# Patient Record
Sex: FEMALE | Race: WHITE | NOT HISPANIC OR LATINO | URBAN - METROPOLITAN AREA
[De-identification: names, ages, dates, MRNs, and addresses within clinical notes are randomized per-mention and may not be internally consistent; named-entity substitution may affect disease eponyms.]

---

## 2021-09-16 ENCOUNTER — OFFICE (OUTPATIENT)
Dept: URBAN - METROPOLITAN AREA CLINIC 64 | Facility: CLINIC | Age: 26
End: 2021-09-16

## 2021-09-16 VITALS
SYSTOLIC BLOOD PRESSURE: 135 MMHG | HEIGHT: 66 IN | DIASTOLIC BLOOD PRESSURE: 91 MMHG | WEIGHT: 240 LBS | HEART RATE: 106 BPM

## 2021-09-16 DIAGNOSIS — R94.5 ABNORMAL RESULTS OF LIVER FUNCTION STUDIES: ICD-10-CM

## 2021-09-16 DIAGNOSIS — B18.2 CHRONIC VIRAL HEPATITIS C: ICD-10-CM

## 2021-09-16 PROCEDURE — 99203 OFFICE O/P NEW LOW 30 MIN: CPT | Performed by: NURSE PRACTITIONER

## 2021-09-16 RX ORDER — FAMOTIDINE 40 MG/1
TABLET, FILM COATED ORAL
Qty: 0 | Refills: 0 | Status: ACTIVE

## 2021-10-28 ENCOUNTER — TELEMEDICINE (OUTPATIENT)
Dept: FAMILY MEDICINE CLINIC | Facility: CLINIC | Age: 26
End: 2021-10-28

## 2021-10-28 DIAGNOSIS — I10 PRIMARY HYPERTENSION: Primary | ICD-10-CM

## 2021-10-28 DIAGNOSIS — F41.9 ANXIETY: ICD-10-CM

## 2021-10-28 DIAGNOSIS — L02.91 CUTANEOUS ABSCESS, UNSPECIFIED SITE: ICD-10-CM

## 2021-10-28 PROCEDURE — 99203 OFFICE O/P NEW LOW 30 MIN: CPT | Performed by: NURSE PRACTITIONER

## 2021-10-28 RX ORDER — SULFAMETHOXAZOLE AND TRIMETHOPRIM 800; 160 MG/1; MG/1
1 TABLET ORAL 2 TIMES DAILY
Qty: 14 TABLET | Refills: 0 | Status: SHIPPED | OUTPATIENT
Start: 2021-10-28 | End: 2021-12-02

## 2021-10-28 RX ORDER — VELPATASVIR AND SOFOSBUVIR 100; 400 MG/1; MG/1
TABLET, FILM COATED ORAL
COMMUNITY
Start: 2021-10-22 | End: 2022-06-23

## 2021-10-28 RX ORDER — LISINOPRIL 5 MG/1
5 TABLET ORAL DAILY
Qty: 30 TABLET | Refills: 1 | Status: SHIPPED | OUTPATIENT
Start: 2021-10-28 | End: 2021-11-22

## 2021-10-28 RX ORDER — FLUOXETINE 10 MG/1
10 CAPSULE ORAL DAILY
Qty: 30 CAPSULE | Refills: 1 | Status: SHIPPED | OUTPATIENT
Start: 2021-10-28 | End: 2021-11-22

## 2021-11-01 PROBLEM — F41.9 ANXIETY: Status: ACTIVE | Noted: 2021-11-01

## 2021-11-01 PROBLEM — I10 PRIMARY HYPERTENSION: Status: ACTIVE | Noted: 2021-11-01

## 2021-11-01 PROBLEM — L02.91 ABSCESS OF SKIN: Status: ACTIVE | Noted: 2021-11-01

## 2021-11-01 NOTE — ASSESSMENT & PLAN NOTE
1. Bactrim DS x 7 days  2. Mupirocin ointment to bilateral nares daily x 7 days  3. Warm compresses to new abscesses, avoiding picking or lancing  4. Consider Derm vs ID referral if persists

## 2021-11-11 ENCOUNTER — TELEPHONE (OUTPATIENT)
Dept: FAMILY MEDICINE CLINIC | Facility: CLINIC | Age: 26
End: 2021-11-11

## 2021-11-11 NOTE — TELEPHONE ENCOUNTER
Caller: Ning Gomez    Relationship: Self    Best call back number:[phone]     What medications are you currently taking:   Current Outpatient Medications on File Prior to Visit   Medication Sig Dispense Refill   • Epclusa 400-100 MG tablet Starting on Monday 11/1/21     • FLUoxetine (PROzac) 10 MG capsule Take 1 capsule by mouth Daily. 30 capsule 1   • lisinopril (PRINIVIL,ZESTRIL) 5 MG tablet Take 1 tablet by mouth Daily. 30 tablet 1   • mupirocin (BACTROBAN) 2 % ointment Apply 1 application topically to the appropriate area as directed Daily. 30 g 0   • sulfamethoxazole-trimethoprim (Bactrim DS) 800-160 MG per tablet Take 1 tablet by mouth 2 (Two) Times a Day. 14 tablet 0   • [DISCONTINUED] traZODone (DESYREL) 100 MG tablet        No current facility-administered medications on file prior to visit.          When did you start taking these medications: 2 WEEKS AGO    Which medication are you concerned about:   • FLUoxetine (PROzac) 10 MG capsule       Who prescribed you this medication: REUBEN GORDON    What are your concerns: PATIENT STATES SHE IS GETTING HER EYEBROWS MICROBLADED AND THEY ARE ASKING THAT SHE STOP TAKING THIS MEDICATION FOR 24 HOURS PRIOR TO SECOND APPOINTMENT. THE MEDICATION IS CAUSING A REACTION TO THE MICROBLADING CHEMICALS.  PATIENT STATES SHE WILL MAKE THE APPOINTMENT FOR HER EYEBROWS ONCE SHE HEARS FROM THE DOCTOR. IT JUST NEEDS TO BE FOR 24 HOURS AND CAN TAKE IT AS SOON AS APPOINTMENT IS OVER.     How long have you had these concerns: 11-11-21

## 2021-11-11 NOTE — TELEPHONE ENCOUNTER
Holding for 24 hours should not cause any problems.  Resume medication regularly once procedure is complete

## 2021-11-22 RX ORDER — FLUOXETINE 10 MG/1
CAPSULE ORAL
Qty: 30 CAPSULE | Refills: 1 | Status: SHIPPED | OUTPATIENT
Start: 2021-11-22 | End: 2021-12-13

## 2021-11-22 RX ORDER — LISINOPRIL 5 MG/1
TABLET ORAL
Qty: 30 TABLET | Refills: 1 | Status: SHIPPED | OUTPATIENT
Start: 2021-11-22 | End: 2021-12-16

## 2021-11-22 NOTE — TELEPHONE ENCOUNTER
Rx Refill Note  Requested Prescriptions     Pending Prescriptions Disp Refills   • FLUoxetine (PROzac) 10 MG capsule [Pharmacy Med Name: FLUOXETINE HCL 10 MG CAPSULE] 30 capsule 1     Sig: TAKE 1 CAPSULE BY MOUTH EVERY DAY   • lisinopril (PRINIVIL,ZESTRIL) 5 MG tablet [Pharmacy Med Name: LISINOPRIL 5 MG TABLET] 30 tablet 1     Sig: TAKE 1 TABLET BY MOUTH EVERY DAY      Last office visit with prescribing clinician: Visit date not found      Next office visit with prescribing clinician: 12/2/2021            Gisele Max MA  11/22/21, 09:05 EST

## 2021-12-02 ENCOUNTER — OFFICE VISIT (OUTPATIENT)
Dept: FAMILY MEDICINE CLINIC | Facility: CLINIC | Age: 26
End: 2021-12-02

## 2021-12-02 VITALS
DIASTOLIC BLOOD PRESSURE: 80 MMHG | HEART RATE: 103 BPM | BODY MASS INDEX: 38.41 KG/M2 | WEIGHT: 239 LBS | SYSTOLIC BLOOD PRESSURE: 122 MMHG | OXYGEN SATURATION: 97 % | HEIGHT: 66 IN

## 2021-12-02 DIAGNOSIS — I10 PRIMARY HYPERTENSION: ICD-10-CM

## 2021-12-02 DIAGNOSIS — Z00.00 PREVENTATIVE HEALTH CARE: ICD-10-CM

## 2021-12-02 DIAGNOSIS — F41.9 ANXIETY: ICD-10-CM

## 2021-12-02 DIAGNOSIS — R19.7 DIARRHEA, UNSPECIFIED TYPE: Primary | ICD-10-CM

## 2021-12-02 PROCEDURE — 85025 COMPLETE CBC W/AUTO DIFF WBC: CPT | Performed by: NURSE PRACTITIONER

## 2021-12-02 PROCEDURE — 80061 LIPID PANEL: CPT | Performed by: NURSE PRACTITIONER

## 2021-12-02 PROCEDURE — 86803 HEPATITIS C AB TEST: CPT | Performed by: NURSE PRACTITIONER

## 2021-12-02 PROCEDURE — 84443 ASSAY THYROID STIM HORMONE: CPT | Performed by: NURSE PRACTITIONER

## 2021-12-02 PROCEDURE — 80053 COMPREHEN METABOLIC PANEL: CPT | Performed by: NURSE PRACTITIONER

## 2021-12-02 PROCEDURE — 99214 OFFICE O/P EST MOD 30 MIN: CPT | Performed by: NURSE PRACTITIONER

## 2021-12-02 RX ORDER — DIPHENOXYLATE HYDROCHLORIDE AND ATROPINE SULFATE 2.5; .025 MG/1; MG/1
1 TABLET ORAL 4 TIMES DAILY PRN
Qty: 30 TABLET | Refills: 0 | Status: SHIPPED | OUTPATIENT
Start: 2021-12-02 | End: 2022-06-23

## 2021-12-02 NOTE — PROGRESS NOTES
"Chief Complaint  Follow-up (4 week follow up depression)    Subjective          Ning Gomez presents to [facility]  History of Present Illness    Patient here for f/u visit.     Patient has anxiety, previously started on Prozac 10 mg daily.  Patient is taking medication as prescribed with no complications.  She reports improvement in feeling down and a noticeable improvement in anxiety, though anxiety does persist.  Patient denies any panic attacks since starting Prozac.    Patient was started on lisinopril 5 mg daily for hypertension, blood pressure is stable today.  Patient denies dizziness, headache or vision changes.  However, she does report that at the end of her workday she is noticing a mild headache.  Patient denies chest pain, cough or dyspnea.    Patient followed by [facility] for Hepatitis C, recently started on Epclusa.  Patient reports that since starting lisinopril, Prozac and Epclusa, she has had persistent watery diarrhea.  She denies abdominal pain, nausea, vomiting, fever, blood or mucus in stool.  No known aggravating or alleviating factors.  Patient has not taking anything OTC to treat.    Objective   Vital Signs:   /80 (BP Location: Left arm, Patient Position: Sitting, Cuff Size: Large Adult)   Pulse 103   Ht 167.6 cm (66\")   Wt 108 kg (239 lb)   SpO2 97%   BMI 38.58 kg/m²       Physical Exam  Constitutional:       Appearance: Normal appearance.   HENT:      Head: Normocephalic.   Cardiovascular:      Rate and Rhythm: Normal rate and regular rhythm.   Pulmonary:      Effort: Pulmonary effort is normal.      Breath sounds: Normal breath sounds.   Abdominal:      General: Abdomen is flat. Bowel sounds are normal.      Palpations: Abdomen is soft.   Musculoskeletal:         General: Normal range of motion.      Cervical back: Neck supple.      Right lower leg: No edema.      Left lower leg: No edema.   Skin:     General: Skin is warm and dry.   Neurological: "      Mental Status: She is alert and oriented to person, place, and time.      Gait: Gait is intact.   Psychiatric:         Attention and Perception: Attention normal.         Mood and Affect: Mood normal.         Speech: Speech normal.        Result Review :                 Assessment and Plan    Diagnoses and all orders for this visit:    1. Diarrhea, unspecified type (Primary)  Assessment & Plan:  1.  Stool studies  2.  Lomotil as needed, do not start taking until stool studies are collected  3.  Most likely cause is Epclusa, encouraged follow-up with GI    Orders:  -     Gastrointestinal Panel, PCR - Stool, Per Rectum; Future  -     Clostridium Difficile EIA - Stool, Per Rectum; Future  -     diphenoxylate-atropine (Lomotil) 2.5-0.025 MG per tablet; Take 1 tablet by mouth 4 (Four) Times a Day As Needed for Diarrhea.  Dispense: 30 tablet; Refill: 0    2. Primary hypertension  Assessment & Plan:  1.  Improving  2.  Continue lisinopril as prescribed  3.  Advised patient to check blood pressure when she arrives home from work and call with readings    Orders:  -     CBC & Differential  -     Comprehensive Metabolic Panel  -     Lipid Panel  -     TSH    3. Preventative health care  -     Hepatitis C Antibody    4. Anxiety  Assessment & Plan:  1.  Increase Prozac to 20 mg daily  2.  Follow-up in 4 weeks      I spent 25 minutes caring for Ning on this date of service. This time includes time spent by me in the following activities:preparing for the visit, reviewing tests, obtaining and/or reviewing a separately obtained history, performing a medically appropriate examination and/or evaluation , counseling and educating the patient/family/caregiver, ordering medications, tests, or procedures, documenting information in the medical record and care coordination  Follow Up   Return in about 4 weeks (around 12/30/2021).  Patient was given instructions and counseling regarding her condition or for health maintenance advice.  Please see specific information pulled into the AVS if appropriate.       Answers for HPI/ROS submitted by the patient on 11/30/2021  Please describe your symptoms.: Follow up after being prescribed blood pressure meds/ prozac a month ago.  Have you had these symptoms before?: Yes  How long have you been having these symptoms?: Greater than 2 weeks  Please list any medications you are currently taking for this condition.: Licinipril 5mg, Prozac 10mg  What is the primary reason for your visit?: Other

## 2021-12-02 NOTE — ASSESSMENT & PLAN NOTE
1.  Stool studies  2.  Lomotil as needed, do not start taking until stool studies are collected  3.  Most likely cause is Epclusa, encouraged follow-up with GI

## 2021-12-02 NOTE — ASSESSMENT & PLAN NOTE
1.  Improving  2.  Continue lisinopril as prescribed  3.  Advised patient to check blood pressure when she arrives home from work and call with readings

## 2021-12-03 LAB
ALBUMIN SERPL-MCNC: 4.6 G/DL (ref 3.5–5.2)
ALBUMIN/GLOB SERPL: 1.6 G/DL
ALP SERPL-CCNC: 53 U/L (ref 39–117)
ALT SERPL W P-5'-P-CCNC: 11 U/L (ref 1–33)
ANION GAP SERPL CALCULATED.3IONS-SCNC: 13.4 MMOL/L (ref 5–15)
AST SERPL-CCNC: 13 U/L (ref 1–32)
BASOPHILS # BLD AUTO: 0.04 10*3/MM3 (ref 0–0.2)
BASOPHILS NFR BLD AUTO: 0.7 % (ref 0–1.5)
BILIRUB SERPL-MCNC: 0.4 MG/DL (ref 0–1.2)
BUN SERPL-MCNC: 15 MG/DL (ref 6–20)
BUN/CREAT SERPL: 20.5 (ref 7–25)
CALCIUM SPEC-SCNC: 9.3 MG/DL (ref 8.6–10.5)
CHLORIDE SERPL-SCNC: 102 MMOL/L (ref 98–107)
CHOLEST SERPL-MCNC: 200 MG/DL (ref 0–200)
CO2 SERPL-SCNC: 23.6 MMOL/L (ref 22–29)
CREAT SERPL-MCNC: 0.73 MG/DL (ref 0.57–1)
DEPRECATED RDW RBC AUTO: 41 FL (ref 37–54)
EOSINOPHIL # BLD AUTO: 0.13 10*3/MM3 (ref 0–0.4)
EOSINOPHIL NFR BLD AUTO: 2.3 % (ref 0.3–6.2)
ERYTHROCYTE [DISTWIDTH] IN BLOOD BY AUTOMATED COUNT: 12.2 % (ref 12.3–15.4)
GFR SERPL CREATININE-BSD FRML MDRD: 96 ML/MIN/1.73
GLOBULIN UR ELPH-MCNC: 2.8 GM/DL
GLUCOSE SERPL-MCNC: 125 MG/DL (ref 65–99)
HCT VFR BLD AUTO: 42.5 % (ref 34–46.6)
HCV AB SER DONR QL: REACTIVE
HDLC SERPL-MCNC: 43 MG/DL (ref 40–60)
HGB BLD-MCNC: 14.6 G/DL (ref 12–15.9)
IMM GRANULOCYTES # BLD AUTO: 0.01 10*3/MM3 (ref 0–0.05)
IMM GRANULOCYTES NFR BLD AUTO: 0.2 % (ref 0–0.5)
LDLC SERPL CALC-MCNC: 136 MG/DL (ref 0–100)
LDLC/HDLC SERPL: 3.12 {RATIO}
LYMPHOCYTES # BLD AUTO: 1.86 10*3/MM3 (ref 0.7–3.1)
LYMPHOCYTES NFR BLD AUTO: 33.5 % (ref 19.6–45.3)
MCH RBC QN AUTO: 31.7 PG (ref 26.6–33)
MCHC RBC AUTO-ENTMCNC: 34.4 G/DL (ref 31.5–35.7)
MCV RBC AUTO: 92.2 FL (ref 79–97)
MONOCYTES # BLD AUTO: 0.34 10*3/MM3 (ref 0.1–0.9)
MONOCYTES NFR BLD AUTO: 6.1 % (ref 5–12)
NEUTROPHILS NFR BLD AUTO: 3.17 10*3/MM3 (ref 1.7–7)
NEUTROPHILS NFR BLD AUTO: 57.2 % (ref 42.7–76)
NRBC BLD AUTO-RTO: 0 /100 WBC (ref 0–0.2)
PLATELET # BLD AUTO: 328 10*3/MM3 (ref 140–450)
PMV BLD AUTO: 10.5 FL (ref 6–12)
POTASSIUM SERPL-SCNC: 4.1 MMOL/L (ref 3.5–5.2)
PROT SERPL-MCNC: 7.4 G/DL (ref 6–8.5)
RBC # BLD AUTO: 4.61 10*6/MM3 (ref 3.77–5.28)
SODIUM SERPL-SCNC: 139 MMOL/L (ref 136–145)
TRIGL SERPL-MCNC: 114 MG/DL (ref 0–150)
TSH SERPL DL<=0.05 MIU/L-ACNC: 1.29 UIU/ML (ref 0.27–4.2)
VLDLC SERPL-MCNC: 21 MG/DL (ref 5–40)
WBC NRBC COR # BLD: 5.55 10*3/MM3 (ref 3.4–10.8)

## 2021-12-03 NOTE — PROGRESS NOTES
Thyroid is normal.  Glucose is elevated at 125, was patient fasting?  Kidneys and liver both look good.  LDL is elevated, otherwise normal lipid panel.  No medication indicated but I would encourage patient to decrease fried and fatty foods.  CBC is within normal limits.  Hep C antibody is positive patient has a current diagnosis of hepatitis C.

## 2021-12-13 RX ORDER — FLUOXETINE HYDROCHLORIDE 20 MG/1
20 CAPSULE ORAL DAILY
Qty: 30 CAPSULE | Refills: 1 | Status: SHIPPED | OUTPATIENT
Start: 2021-12-13 | End: 2022-01-07

## 2021-12-13 RX ORDER — FLUOXETINE HYDROCHLORIDE 20 MG/1
10 CAPSULE ORAL DAILY
Qty: 30 CAPSULE | Refills: 1 | Status: CANCELLED | OUTPATIENT
Start: 2021-12-13

## 2021-12-13 NOTE — TELEPHONE ENCOUNTER
Caller: CbjazmineNing    Relationship: Self    Best call back number: [phone]  Requested Prescriptions:   Requested Prescriptions     Pending Prescriptions Disp Refills   • FLUoxetine (PROzac) 10 MG capsule 30 capsule 1     Sig: Take 1 capsule by mouth Daily.        Pharmacy where request should be sent: [facility]/PHARMACY #[id number] [place], [place] - [address] - [phone]  - [phone] FX     Additional details provided by patient: WILL BE OUT OF MEDICATION TOMORROW,  STATES THAT THIS WAS INCREASED TO 20 MG   Does the patient have less than a 3 day supply:  [x] Yes  [] No    Roselia Winston Rep   12/13/21 14:38 EST

## 2021-12-16 RX ORDER — LISINOPRIL 5 MG/1
TABLET ORAL
Qty: 30 TABLET | Refills: 1 | Status: SHIPPED | OUTPATIENT
Start: 2021-12-16 | End: 2022-01-07

## 2021-12-16 RX ORDER — FLUOXETINE 10 MG/1
CAPSULE ORAL
Qty: 30 CAPSULE | Refills: 1 | OUTPATIENT
Start: 2021-12-16

## 2021-12-30 ENCOUNTER — OFFICE VISIT (OUTPATIENT)
Dept: FAMILY MEDICINE CLINIC | Facility: CLINIC | Age: 26
End: 2021-12-30

## 2021-12-30 VITALS
HEART RATE: 85 BPM | HEIGHT: 66 IN | DIASTOLIC BLOOD PRESSURE: 85 MMHG | OXYGEN SATURATION: 98 % | WEIGHT: 237 LBS | SYSTOLIC BLOOD PRESSURE: 124 MMHG | BODY MASS INDEX: 38.09 KG/M2

## 2021-12-30 DIAGNOSIS — I10 PRIMARY HYPERTENSION: Primary | ICD-10-CM

## 2021-12-30 DIAGNOSIS — R19.7 DIARRHEA, UNSPECIFIED TYPE: ICD-10-CM

## 2021-12-30 DIAGNOSIS — F41.9 ANXIETY: ICD-10-CM

## 2021-12-30 DIAGNOSIS — Z00.00 PREVENTATIVE HEALTH CARE: ICD-10-CM

## 2021-12-30 PROCEDURE — 99214 OFFICE O/P EST MOD 30 MIN: CPT | Performed by: NURSE PRACTITIONER

## 2021-12-30 PROCEDURE — 90471 IMMUNIZATION ADMIN: CPT | Performed by: NURSE PRACTITIONER

## 2021-12-30 PROCEDURE — 90686 IIV4 VACC NO PRSV 0.5 ML IM: CPT | Performed by: NURSE PRACTITIONER

## 2021-12-30 RX ORDER — MONTELUKAST SODIUM 4 MG/1
1 TABLET, CHEWABLE ORAL 2 TIMES DAILY
Qty: 60 TABLET | Refills: 1 | Status: SHIPPED | OUTPATIENT
Start: 2021-12-30 | End: 2022-02-03

## 2021-12-30 NOTE — PROGRESS NOTES
Injection  Injection performed in left deltoid by Gisele Max MA. Patient tolerated the procedure well without complications.  12/30/21   Gisele Max MA

## 2021-12-30 NOTE — PROGRESS NOTES
"Chief Complaint  Anxiety, Follow-up (4 wks; pt reports that she hasn't been able to leave a stool sample d/t schedule conflicts, but reports she is still expc diarrhea after she eats every time.), and Diarrhea (pt reports no blood, but does look orange every time.)    Subjective          Ning Gomez presents to [facility]  History of Present Illness    Patient here for f/u visit.     Patient continues to have diarrhea every time she eats.  She denies blood or mucus in stool.  Patient reports when she is not eating, symptoms are stable.  Patient denies abdominal pain, nausea or vomiting.  Stool studies previously ordered but she has not yet completed.  Patient is followed by GI for hepatitis C.    Patient has anxiety, Prozac increased to 20mg daily at last visit. Patient taking as prescribed with no complications.  She reports significant improvement in symptoms since dosage increase.  Patient has no acute complaints.    Patient is taking lisinopril 5 mg daily as prescribed for hypertension.  Blood pressure is stable.  She has no acute complaints.    Objective   Vital Signs:   /85 (BP Location: Left arm, Patient Position: Sitting, Cuff Size: Large Adult)   Pulse 85   Ht 167.6 cm (65.98\")   Wt 108 kg (237 lb)   SpO2 98%   BMI 38.27 kg/m²       Physical Exam  Constitutional:       Appearance: Normal appearance.   HENT:      Head: Normocephalic.   Cardiovascular:      Rate and Rhythm: Normal rate and regular rhythm.   Pulmonary:      Effort: Pulmonary effort is normal.      Breath sounds: Normal breath sounds.   Abdominal:      General: Abdomen is flat. Bowel sounds are normal.      Palpations: Abdomen is soft.   Musculoskeletal:         General: Normal range of motion.      Cervical back: Neck supple.      Right lower leg: No edema.      Left lower leg: No edema.   Skin:     General: Skin is warm and dry.   Neurological:      Mental Status: She is alert and oriented to " person, place, and time.      Gait: Gait is intact.   Psychiatric:         Attention and Perception: Attention normal.         Mood and Affect: Mood normal.         Speech: Speech normal.        Result Review :     CMP    CMP 12/2/21   Glucose 125 (A)   BUN 15   Creatinine 0.73   eGFR Non African Am 96   Sodium 139   Potassium 4.1   Chloride 102   Calcium 9.3   Albumin 4.60   Total Bilirubin 0.4   Alkaline Phosphatase 53   AST (SGOT) 13   ALT (SGPT) 11   (A) Abnormal value            CBC    CBC 12/2/21   WBC 5.55   RBC 4.61   Hemoglobin 14.6   Hematocrit 42.5   MCV 92.2   MCH 31.7   MCHC 34.4   RDW 12.2 (A)   Platelets 328   (A) Abnormal value            Lipid Panel    Lipid Panel 12/2/21   Total Cholesterol 200   Triglycerides 114   HDL Cholesterol 43   VLDL Cholesterol 21   LDL Cholesterol  136 (A)   LDL/HDL Ratio 3.12   (A) Abnormal value            TSH    TSH 12/2/21   TSH 1.290                     Assessment and Plan    Diagnoses and all orders for this visit:    1. Primary hypertension (Primary)  Assessment & Plan:  1.  Stable  2.  Continue lisinopril as prescribed      2. Diarrhea, unspecified type  Assessment & Plan:  1.  Reviewed labs  2.  Advised patient to leave sample for stool studies  3.  Symptoms consistent with IBS  4.  Recommended patient discuss with GI  5.  Start Colestid 1 g twice daily        3. Anxiety  Assessment & Plan:  1.  Continue Prozac 20 mg daily      4. Preventative health care  Assessment & Plan:  1.  Flu vaccine ordered      Other orders  -     colestipol (COLESTID) 1 g tablet; Take 1 tablet by mouth 2 (Two) Times a Day.  Dispense: 60 tablet; Refill: 1  -     FluLaval/Fluarix/Fluzone >6 Months (2415-3550)    I spent 30 minutes caring for Ning on this date of service. This time includes time spent by me in the following activities:preparing for the visit, reviewing tests, obtaining and/or reviewing a separately obtained history, performing a medically appropriate examination and/or  evaluation , counseling and educating the patient/family/caregiver, ordering medications, tests, or procedures, documenting information in the medical record, independently interpreting results and communicating that information with the patient/family/caregiver and care coordination  Follow Up   Return in about 3 months (around 3/30/2022).  Patient was given instructions and counseling regarding her condition or for health maintenance advice. Please see specific information pulled into the AVS if appropriate.

## 2021-12-30 NOTE — ASSESSMENT & PLAN NOTE
1.  Reviewed labs  2.  Advised patient to leave sample for stool studies  3.  Symptoms consistent with IBS  4.  Recommended patient discuss with GI  5.  Start Colestid 1 g twice daily

## 2022-01-07 RX ORDER — FLUOXETINE HYDROCHLORIDE 20 MG/1
CAPSULE ORAL
Qty: 30 CAPSULE | Refills: 1 | Status: SHIPPED | OUTPATIENT
Start: 2022-01-07 | End: 2022-02-03

## 2022-01-07 RX ORDER — LISINOPRIL 5 MG/1
TABLET ORAL
Qty: 30 TABLET | Refills: 1 | Status: SHIPPED | OUTPATIENT
Start: 2022-01-07 | End: 2022-02-03

## 2022-02-03 RX ORDER — FLUOXETINE HYDROCHLORIDE 20 MG/1
CAPSULE ORAL
Qty: 30 CAPSULE | Refills: 1 | Status: SHIPPED | OUTPATIENT
Start: 2022-02-03 | End: 2022-03-07

## 2022-02-03 RX ORDER — MONTELUKAST SODIUM 4 MG/1
TABLET, CHEWABLE ORAL
Qty: 60 TABLET | Refills: 1 | Status: SHIPPED | OUTPATIENT
Start: 2022-02-03 | End: 2022-06-23

## 2022-02-03 RX ORDER — LISINOPRIL 5 MG/1
TABLET ORAL
Qty: 30 TABLET | Refills: 1 | Status: SHIPPED | OUTPATIENT
Start: 2022-02-03 | End: 2022-03-07

## 2022-03-07 RX ORDER — LISINOPRIL 5 MG/1
TABLET ORAL
Qty: 30 TABLET | Refills: 1 | Status: SHIPPED | OUTPATIENT
Start: 2022-03-07 | End: 2022-04-08

## 2022-03-07 RX ORDER — FLUOXETINE HYDROCHLORIDE 20 MG/1
CAPSULE ORAL
Qty: 30 CAPSULE | Refills: 1 | Status: SHIPPED | OUTPATIENT
Start: 2022-03-07 | End: 2022-04-08

## 2022-03-07 NOTE — TELEPHONE ENCOUNTER
Rx Refill Note  Requested Prescriptions     Pending Prescriptions Disp Refills   • lisinopril (PRINIVIL,ZESTRIL) 5 MG tablet [Pharmacy Med Name: LISINOPRIL 5 MG TABLET] 30 tablet 1     Sig: TAKE 1 TABLET BY MOUTH EVERY DAY   • FLUoxetine (PROzac) 20 MG capsule [Pharmacy Med Name: FLUOXETINE HCL 20 MG CAPSULE] 30 capsule 1     Sig: TAKE 1 CAPSULE BY MOUTH EVERY DAY      Last office visit with prescribing clinician: 12/30/2021      Next office visit with prescribing clinician: Visit date not found            Gisele Max MA  03/07/22, 10:44 EST

## 2022-04-08 RX ORDER — FLUOXETINE HYDROCHLORIDE 20 MG/1
CAPSULE ORAL
Qty: 30 CAPSULE | Refills: 1 | Status: SHIPPED | OUTPATIENT
Start: 2022-04-08 | End: 2022-05-10

## 2022-04-08 RX ORDER — LISINOPRIL 5 MG/1
TABLET ORAL
Qty: 30 TABLET | Refills: 1 | Status: SHIPPED | OUTPATIENT
Start: 2022-04-08 | End: 2022-05-10

## 2022-04-08 NOTE — TELEPHONE ENCOUNTER
Rx Refill Note  Requested Prescriptions     Pending Prescriptions Disp Refills   • FLUoxetine (PROzac) 20 MG capsule [Pharmacy Med Name: FLUOXETINE HCL 20 MG CAPSULE] 30 capsule 1     Sig: TAKE 1 CAPSULE BY MOUTH EVERY DAY      Last office visit with prescribing clinician: 12/30/2021      Next office visit with prescribing clinician: Visit date not found            Gisele Max MA  04/08/22, 11:12 EDT

## 2022-04-08 NOTE — TELEPHONE ENCOUNTER
Rx Refill Note  Requested Prescriptions     Pending Prescriptions Disp Refills   • lisinopril (PRINIVIL,ZESTRIL) 5 MG tablet [Pharmacy Med Name: LISINOPRIL 5 MG TABLET] 30 tablet 1     Sig: TAKE 1 TABLET BY MOUTH EVERY DAY      Last office visit with prescribing clinician: 12/30/2021      Next office visit with prescribing clinician: Visit date not found            Gisele Max MA  04/08/22, 11:35 EDT

## 2022-04-30 ENCOUNTER — HOSPITAL ENCOUNTER (EMERGENCY)
Facility: HOSPITAL | Age: 27
Discharge: HOME | End: 2022-04-30
Attending: EMERGENCY MEDICINE | Admitting: EMERGENCY MEDICINE

## 2022-04-30 VITALS
BODY MASS INDEX: 36 KG/M2 | TEMPERATURE: 98.3 F | RESPIRATION RATE: 14 BRPM | HEART RATE: 71 BPM | SYSTOLIC BLOOD PRESSURE: 132 MMHG | WEIGHT: 223.99 LBS | HEIGHT: 66 IN | OXYGEN SATURATION: 100 % | DIASTOLIC BLOOD PRESSURE: 70 MMHG

## 2022-04-30 DIAGNOSIS — N90.7 LABIAL CYST: Primary | ICD-10-CM

## 2022-04-30 PROCEDURE — 99282 EMERGENCY DEPT VISIT SF MDM: CPT

## 2022-04-30 RX ORDER — SULFAMETHOXAZOLE AND TRIMETHOPRIM 800; 160 MG/1; MG/1
1 TABLET ORAL 2 TIMES DAILY
Qty: 20 TABLET | Refills: 0 | Status: SHIPPED | OUTPATIENT
Start: 2022-04-30 | End: 2022-06-23

## 2022-04-30 RX ORDER — FLUCONAZOLE 150 MG/1
150 TABLET ORAL ONCE
Qty: 1 TABLET | Refills: 0 | Status: SHIPPED | OUTPATIENT
Start: 2022-04-30 | End: 2022-04-30

## 2022-05-01 NOTE — ED PROVIDER NOTES
Subjective   Patient is a 26-year-old obese white female with no significant medical history presents today with complaints of painful raised lesion to the right labia.  She states it has been present for a couple of days.  She states she has had these lesions come and go intermittently over the last few months.  She denies any drainage from these areas.  States they are tender to touch.  She denies any fever chills nausea vomiting abdominal pain vaginal bleeding discharge, concern for STI or other complaint.          Review of Systems   Constitutional: Negative for chills and fever.   Gastrointestinal: Negative for abdominal pain, nausea and vomiting.   Genitourinary: Positive for genital sores. Negative for dysuria, vaginal bleeding, vaginal discharge and vaginal pain.   Skin:        Lesion to right labia       Past Medical History:   Diagnosis Date   • Hepatitis C    • Herpes        No Known Allergies    Past Surgical History:   Procedure Laterality Date   •  SECTION     • CYST REMOVAL Right     wrist   • DENTAL PROCEDURE     • GANGLION CYST EXCISION     • WISDOM TOOTH EXTRACTION         Family History   Problem Relation Age of Onset   • Hypertension Father    • Heart disease Maternal Grandfather    • Diabetes Maternal Grandfather    • Hypertension Paternal Grandmother    • Breast cancer Other        Social History     Socioeconomic History   • Marital status: Single   Tobacco Use   • Smoking status: Former Smoker     Packs/day: 0.50     Years: 4.00     Pack years: 2.00     Types: Cigarettes     Quit date: 2019     Years since quitting: 3.3   • Smokeless tobacco: Never Used   Vaping Use   • Vaping Use: Every day   • Substances: Nicotine   • Passive vaping exposure: Yes   Substance and Sexual Activity   • Alcohol use: Yes     Comment: occasionaly   • Drug use: Not Currently     Types: Heroin   • Sexual activity: Yes     Partners: Male           Objective   Physical Exam  Vital signs and triage  nurse note reviewed.  Constitutional: Awake, alert; well-developed and well-nourished. No acute distress is noted.  Cardiovascular: Regular rate and rhythm, normal S1-S2.  No murmur noted.  Pulmonary: Respiratory effort regular nonlabored, breath sounds clear to auscultation all fields.  Abdomen: Soft, nontender, nondistended with normoactive bowel sounds; no rebound or guarding.  Genital exam reveals a small red raised lesion to the right outer labia.  There does appear to be some central fluctuance.  There is no streaking.  No active drainage.  No crepitus.  Musculoskeletal: Independent range of motion of all extremities with no palpable tenderness or edema.  Neuro: Alert oriented x3, speech is clear and appropriate, GCS 15.    Skin: Flesh tone, warm, dry, intact; no erythematous or petechial rash or lesion.      Incision & Drainage    Date/Time: 5/1/2022 9:00 AM  Performed by: Mone Olivares APRN  Authorized by: Waldo Weiss MD     Consent:     Consent obtained:  Verbal    Consent given by:  Patient    Risks, benefits, and alternatives were discussed: yes      Risks discussed:  Bleeding, incomplete drainage, infection and pain  Universal protocol:     Immediately prior to procedure, a time out was called: yes      Patient identity confirmed:  Verbally with patient and arm band  Location:     Type:  Abscess    Location:  Anogenital    Anogenital location: Right outer labia.  Pre-procedure details:     Skin preparation:  Chlorhexidine  Anesthesia:     Anesthesia method:  Local infiltration    Local anesthetic:  Lidocaine 1% w/o epi  Procedure type:     Complexity:  Simple  Procedure details:     Incision types:  Stab incision    Wound management:  Probed and deloculated    Drainage:  Bloody    Drainage amount:  Scant    Packing materials:  None  Post-procedure details:     Procedure completion:  Tolerated well, no immediate complications               ED Course                                                  MDM  Number of Diagnoses or Management Options  Labial cyst  Diagnosis management comments: Attempt was made to I&D the lesion as noted above.  There was no drainage or purulence noted.  Patient is otherwise well-appearing and in no distress.  She is afebrile and hemodynamically stable.  She will be given prescription for antibiotics and instructed to follow-up with primary care provider.    Diagnosis and treatment plan discussed with patient.  Patient agreeable to plan.   I discussed findings with patient who voices understanding of discharge instructions, signs and symptoms requiring return to ED; discharged improved and in stable condition with follow up for re-evaluation.      Patient Progress  Patient progress: stable      Final diagnoses:   Labial cyst       ED Disposition  ED Disposition     ED Disposition   Discharge    Condition   Stable    Comment   --             Meño Jo MD  [address]  [place] [address] [phone]          Mamie Pereira APRN  [address]  [address]  [place] [address] [phone]          [facility]  [address]  [place] [address]  [phone]             Medication List      New Prescriptions    sulfamethoxazole-trimethoprim 800-160 MG per tablet  Commonly known as: BACTRIM DS,SEPTRA DS  Take 1 tablet by mouth 2 (Two) Times a Day.        ASK your doctor about these medications    fluconazole 150 MG tablet  Commonly known as: DIFLUCAN  Take 1 tablet by mouth 1 (One) Time for 1 dose.  Ask about: Should I take this medication?           Where to Get Your Medications      These medications were sent to [facility]/pharmacy #[id number] - [place], [place] - [address] [place] [address] - [phone] [phone]   [address] [place] [address]    Phone: [phone]   · fluconazole 150 MG tablet  · sulfamethoxazole-trimethoprim 800-160 MG per tablet          Mone Olivares APRN  05/01/22 0953

## 2022-05-10 RX ORDER — FLUOXETINE HYDROCHLORIDE 20 MG/1
CAPSULE ORAL
Qty: 30 CAPSULE | Refills: 1 | Status: SHIPPED | OUTPATIENT
Start: 2022-05-10 | End: 2022-06-06

## 2022-05-10 RX ORDER — LISINOPRIL 5 MG/1
TABLET ORAL
Qty: 30 TABLET | Refills: 1 | Status: SHIPPED | OUTPATIENT
Start: 2022-05-10 | End: 2022-06-06

## 2022-05-10 NOTE — TELEPHONE ENCOUNTER
Rx Refill Note  Requested Prescriptions     Pending Prescriptions Disp Refills   • FLUoxetine (PROzac) 20 MG capsule [Pharmacy Med Name: FLUOXETINE HCL 20 MG CAPSULE] 30 capsule 1     Sig: TAKE 1 CAPSULE BY MOUTH EVERY DAY      Last office visit with prescribing clinician: 12/30/2021      Next office visit with prescribing clinician: Visit date not found            Gisele Max MA  05/10/22, 08:32 EDT

## 2022-06-06 RX ORDER — LISINOPRIL 5 MG/1
TABLET ORAL
Qty: 30 TABLET | Refills: 1 | Status: SHIPPED | OUTPATIENT
Start: 2022-06-06 | End: 2022-06-21 | Stop reason: SDUPTHER

## 2022-06-06 RX ORDER — FLUOXETINE HYDROCHLORIDE 20 MG/1
CAPSULE ORAL
Qty: 30 CAPSULE | Refills: 1 | Status: SHIPPED | OUTPATIENT
Start: 2022-06-06 | End: 2022-08-12

## 2022-06-21 RX ORDER — LISINOPRIL 5 MG/1
5 TABLET ORAL DAILY
Qty: 90 TABLET | Refills: 1 | Status: SHIPPED | OUTPATIENT
Start: 2022-06-21 | End: 2023-01-16

## 2022-06-23 ENCOUNTER — OFFICE VISIT (OUTPATIENT)
Dept: FAMILY MEDICINE CLINIC | Facility: CLINIC | Age: 27
End: 2022-06-23

## 2022-06-23 VITALS
OXYGEN SATURATION: 99 % | HEART RATE: 69 BPM | WEIGHT: 218 LBS | DIASTOLIC BLOOD PRESSURE: 74 MMHG | SYSTOLIC BLOOD PRESSURE: 118 MMHG | BODY MASS INDEX: 35.03 KG/M2 | HEIGHT: 66 IN

## 2022-06-23 DIAGNOSIS — G43.809 OTHER MIGRAINE WITHOUT STATUS MIGRAINOSUS, NOT INTRACTABLE: ICD-10-CM

## 2022-06-23 DIAGNOSIS — L02.91 CUTANEOUS ABSCESS, UNSPECIFIED SITE: Primary | ICD-10-CM

## 2022-06-23 PROBLEM — G43.909 MIGRAINE: Status: ACTIVE | Noted: 2022-06-23

## 2022-06-23 PROCEDURE — 99213 OFFICE O/P EST LOW 20 MIN: CPT | Performed by: NURSE PRACTITIONER

## 2022-06-23 RX ORDER — CLINDAMYCIN PHOSPHATE 11.9 MG/ML
SOLUTION TOPICAL 2 TIMES DAILY
Qty: 120 ML | Refills: 0 | Status: SHIPPED | OUTPATIENT
Start: 2022-06-23 | End: 2022-06-30

## 2022-06-23 RX ORDER — SULFAMETHOXAZOLE AND TRIMETHOPRIM 800; 160 MG/1; MG/1
1 TABLET ORAL 2 TIMES DAILY
Qty: 14 TABLET | Refills: 0 | Status: SHIPPED | OUTPATIENT
Start: 2022-06-23 | End: 2022-07-30

## 2022-06-23 NOTE — PROGRESS NOTES
"Chief Complaint  Abscess (Boil in groin area ) and Headache (Has had a HA since yesterday. Not improving )    Subjective        Ning Gomez presents to [facility]  History of Present Illness    Patient presents with boil to left groin, noted two days ago. She is also having accompanying headache and chills. Patient has history of abscesses, last one in April.  An I&D was attempted during ER visit but unsuccessful.    Objective   Vital Signs:  /74 (BP Location: Left arm, Patient Position: Sitting, Cuff Size: Adult)   Pulse 69   Ht 167.6 cm (66\")   Wt 98.9 kg (218 lb)   SpO2 99%   BMI 35.19 kg/m²   Estimated body mass index is 35.19 kg/m² as calculated from the following:    Height as of this encounter: 167.6 cm (66\").    Weight as of this encounter: 98.9 kg (218 lb).          Physical Exam  Constitutional:       Appearance: Normal appearance.   HENT:      Head: Normocephalic.   Cardiovascular:      Rate and Rhythm: Normal rate and regular rhythm.   Pulmonary:      Effort: Pulmonary effort is normal.      Breath sounds: Normal breath sounds.   Abdominal:      General: Abdomen is flat. Bowel sounds are normal.      Palpations: Abdomen is soft.   Genitourinary:      Musculoskeletal:         General: Normal range of motion.      Cervical back: Neck supple.      Right lower leg: No edema.      Left lower leg: No edema.   Skin:     General: Skin is warm and dry.   Neurological:      Mental Status: She is alert and oriented to person, place, and time.      Gait: Gait is intact.   Psychiatric:         Attention and Perception: Attention normal.         Mood and Affect: Mood normal.         Speech: Speech normal.        Result Review :                Assessment and Plan   Diagnoses and all orders for this visit:    1. Cutaneous abscess, unspecified site (Primary)  Assessment & Plan:  1.  Warm compresses 3 times daily  2.  Clindamycin solution, clean area twice daily  3.  Bactrim DS " twice daily x7 days  4.  Avoid wearing tight clothing  5.  If persists or worsens, refer to dermatology versus surgery      2. Other migraine without status migrainosus, not intractable  Assessment & Plan:  1.  Ubrelvy 50 mg sample provided to patient, call if headache persists        Other orders  -     sulfamethoxazole-trimethoprim (Bactrim DS) 800-160 MG per tablet; Take 1 tablet by mouth 2 (Two) Times a Day.  Dispense: 14 tablet; Refill: 0  -     clindamycin (Cleocin-T) 1 % external solution; Apply  topically to the appropriate area as directed 2 (Two) Times a Day for 7 days.  Dispense: 120 mL; Refill: 0         I spent 25 minutes caring for Ning on this date of service. This time includes time spent by me in the following activities:preparing for the visit, obtaining and/or reviewing a separately obtained history, performing a medically appropriate examination and/or evaluation , counseling and educating the patient/family/caregiver, ordering medications, tests, or procedures, documenting information in the medical record and care coordination  Follow Up   Return if symptoms worsen or fail to improve.  Patient was given instructions and counseling regarding her condition or for health maintenance advice. Please see specific information pulled into the AVS if appropriate.

## 2022-06-23 NOTE — ASSESSMENT & PLAN NOTE
1.  Warm compresses 3 times daily  2.  Clindamycin solution, clean area twice daily  3.  Bactrim DS twice daily x7 days  4.  Avoid wearing tight clothing  5.  If persists or worsens, refer to dermatology versus surgery

## 2022-07-20 RX ORDER — CLINDAMYCIN PHOSPHATE 11.9 MG/ML
SOLUTION TOPICAL 2 TIMES DAILY
Qty: 120 ML | Refills: 0 | OUTPATIENT
Start: 2022-07-20 | End: 2022-07-27

## 2022-08-12 RX ORDER — FLUOXETINE HYDROCHLORIDE 20 MG/1
CAPSULE ORAL
Qty: 30 CAPSULE | Refills: 1 | Status: SHIPPED | OUTPATIENT
Start: 2022-08-12 | End: 2022-09-13

## 2022-09-13 RX ORDER — FLUOXETINE HYDROCHLORIDE 20 MG/1
CAPSULE ORAL
Qty: 30 CAPSULE | Refills: 1 | Status: SHIPPED | OUTPATIENT
Start: 2022-09-13 | End: 2022-10-05

## 2022-10-05 RX ORDER — FLUOXETINE HYDROCHLORIDE 20 MG/1
CAPSULE ORAL
Qty: 30 CAPSULE | Refills: 1 | Status: SHIPPED | OUTPATIENT
Start: 2022-10-05 | End: 2022-11-01

## 2022-10-14 ENCOUNTER — HOSPITAL ENCOUNTER (OUTPATIENT)
Facility: HOSPITAL | Age: 27
Discharge: HOME | End: 2022-10-14
Attending: EMERGENCY MEDICINE

## 2022-10-14 VITALS
RESPIRATION RATE: 16 BRPM | TEMPERATURE: 98.6 F | HEIGHT: 66 IN | HEART RATE: 86 BPM | WEIGHT: 206.3 LBS | DIASTOLIC BLOOD PRESSURE: 61 MMHG | OXYGEN SATURATION: 95 % | BODY MASS INDEX: 33.16 KG/M2 | SYSTOLIC BLOOD PRESSURE: 106 MMHG

## 2022-10-14 DIAGNOSIS — L02.91 ABSCESS: Primary | ICD-10-CM

## 2022-10-14 PROCEDURE — G0463 HOSPITAL OUTPT CLINIC VISIT: HCPCS | Performed by: EMERGENCY MEDICINE

## 2022-10-14 PROCEDURE — EDLOS: Performed by: EMERGENCY MEDICINE

## 2022-10-14 PROCEDURE — 99212 OFFICE O/P EST SF 10 MIN: CPT | Performed by: EMERGENCY MEDICINE

## 2022-10-14 RX ORDER — DOXYCYCLINE HYCLATE 100 MG/1
100 TABLET, DELAYED RELEASE ORAL 2 TIMES DAILY
Qty: 14 TABLET | Refills: 0 | Status: SHIPPED | OUTPATIENT
Start: 2022-10-14 | End: 2022-10-21

## 2022-10-14 RX ORDER — FLUCONAZOLE 150 MG/1
150 TABLET ORAL ONCE
Qty: 1 TABLET | Refills: 0 | Status: SHIPPED | OUTPATIENT
Start: 2022-10-14 | End: 2022-10-14

## 2022-10-14 RX ORDER — SACCHAROMYCES BOULARDII 250 MG
250 CAPSULE ORAL 2 TIMES DAILY
Qty: 14 CAPSULE | Refills: 0 | Status: SHIPPED | OUTPATIENT
Start: 2022-10-14 | End: 2022-10-21

## 2022-10-28 ENCOUNTER — TELEPHONE (OUTPATIENT)
Dept: FAMILY MEDICINE CLINIC | Facility: CLINIC | Age: 27
End: 2022-10-28

## 2022-10-28 NOTE — TELEPHONE ENCOUNTER
"  Caller: Ning Gomez    Relationship: Self    Best call back number: [phone] (Mobile)      What was the call regarding:  PATIENT HAD AN ABSCESS RUPTURE, JUST BELOW HER  SCAR AND THE HOLE SEEMS TO BE GETTING LARGER.     PATIENT CAN SEE WHITE \"TISSUE\" INSIDE THE HOLE     THE CLINDAMYCIN THAT WAS PRESCRIBED WAS VERY PAINFUL TO USE LAST TIME     THE SOONEST APPT WAS IN A COUPLE OF WEEKS AND PATIENT WANTED TO HAVE A CALLBACK TO DISCUSS     Do you require a callback:  YES PLEASE   "

## 2022-11-01 RX ORDER — FLUOXETINE HYDROCHLORIDE 20 MG/1
CAPSULE ORAL
Qty: 30 CAPSULE | Refills: 1 | Status: SHIPPED | OUTPATIENT
Start: 2022-11-01 | End: 2022-12-02

## 2022-12-02 RX ORDER — FLUOXETINE HYDROCHLORIDE 20 MG/1
CAPSULE ORAL
Qty: 30 CAPSULE | Refills: 1 | Status: SHIPPED | OUTPATIENT
Start: 2022-12-02 | End: 2022-12-19 | Stop reason: SDUPTHER

## 2022-12-19 ENCOUNTER — TELEPHONE (OUTPATIENT)
Dept: FAMILY MEDICINE CLINIC | Facility: CLINIC | Age: 27
End: 2022-12-19

## 2022-12-19 RX ORDER — FLUOXETINE HYDROCHLORIDE 20 MG/1
20 CAPSULE ORAL DAILY
Qty: 90 CAPSULE | Refills: 1 | Status: SHIPPED | OUTPATIENT
Start: 2022-12-19 | End: 2023-01-25

## 2022-12-19 NOTE — TELEPHONE ENCOUNTER
Pt called in with concern of sore throat, hurts to swallow, noticed white spots in throat, not sure of fever sleeping a lot and has woke up soaking wet. Pt states Saturday 12/17 woke up with a scratchy throat, noticed the white spots on Sunday 12/18. Pt was wanting same day appt, informed that schedule is full.  Please advise

## 2022-12-22 NOTE — TELEPHONE ENCOUNTER
PATIENT IS CALLING IN SHE DID GO TO  AND WAS DIAGNOSED WITH [facility] ON Monday, SHE IS STILL REALLY CONGESTED AND SHE FEELS LIKE THERE IS STUFF LEAKING OUT OF HER EAR. SHE IS NOT SURE WHAT TO DO.      PLEASE ADVISE    CALLBACK NUMBER IS  [phone]

## 2022-12-22 NOTE — TELEPHONE ENCOUNTER
What color is drainage?  She was prescribed amoxicillin which will treat strep throat and ear infections.  Is she taking an OTC decongestant

## 2022-12-22 NOTE — TELEPHONE ENCOUNTER
Pt notified and expressed understanding. She said she does not see drainage at all it just feels like there is fluid in her ears. She will get an OTC decongestant and give that a try to see if that helps.

## 2023-01-16 RX ORDER — LISINOPRIL 5 MG/1
TABLET ORAL
Qty: 30 TABLET | Refills: 5 | Status: SHIPPED | OUTPATIENT
Start: 2023-01-16 | End: 2023-01-25

## 2023-01-25 ENCOUNTER — OFFICE VISIT (OUTPATIENT)
Dept: FAMILY MEDICINE CLINIC | Facility: CLINIC | Age: 28
End: 2023-01-25
Payer: COMMERCIAL

## 2023-01-25 VITALS
BODY MASS INDEX: 35 KG/M2 | DIASTOLIC BLOOD PRESSURE: 81 MMHG | HEIGHT: 66 IN | HEART RATE: 85 BPM | SYSTOLIC BLOOD PRESSURE: 116 MMHG | WEIGHT: 217.8 LBS | OXYGEN SATURATION: 100 %

## 2023-01-25 DIAGNOSIS — F41.9 ANXIETY: ICD-10-CM

## 2023-01-25 DIAGNOSIS — F31.62 BIPOLAR DISORDER, CURRENT EPISODE MIXED, MODERATE: Primary | ICD-10-CM

## 2023-01-25 DIAGNOSIS — R51.9 NONINTRACTABLE HEADACHE, UNSPECIFIED CHRONICITY PATTERN, UNSPECIFIED HEADACHE TYPE: ICD-10-CM

## 2023-01-25 DIAGNOSIS — E66.9 CLASS 2 OBESITY WITHOUT SERIOUS COMORBIDITY WITH BODY MASS INDEX (BMI) OF 35.0 TO 35.9 IN ADULT, UNSPECIFIED OBESITY TYPE: ICD-10-CM

## 2023-01-25 PROCEDURE — 99214 OFFICE O/P EST MOD 30 MIN: CPT | Performed by: NURSE PRACTITIONER

## 2023-01-25 RX ORDER — LAMOTRIGINE 25 MG/1
TABLET ORAL
COMMUNITY
Start: 2023-01-16 | End: 2023-02-04 | Stop reason: SDUPTHER

## 2023-01-25 NOTE — PROGRESS NOTES
Chief Complaint  Chief Complaint   Patient presents with   • Medication Problem     Pt would like to discuss all of her medications and would like to discuss rx for wellbutrin for weight loss and anxiety    • Headache     Pt says she wakes up in the middle of the night most nights craving sugar and has headaches most nights. Pt says her headaches are getting more frequent since starting Lamictal.            Subjective          Ning Gomez presents to [facility] for   History of Present Illness     Anxiety, pt has been on prozac reports having no emotions on prozac, was overwhelmed, snapping, with daily mood swings highs/lows, spending excessive money would make her happy. She was having a breakdown at work last week, could not get appt here so called [facility], the provider d/c'd prozac, she is now on lamictal 25mg for 1 week, with titration every 2 weeks to 200 mg.  Her next appointment with [facility]i is 2/13, has job schedule issues and would like to try to manage medication here.     Hx of htn, she lost about 30 pounds, but gained 10 back, she is no longer taking lisinopril. Blood pressure is stable today.  She reports worsening of headaches int he last week, has been waking up and eating in middle of the night.     Hx of hep c, treated 2020.         PHQ-9 Depression Screening  Little interest or pleasure in doing things? 0-->not at all   Feeling down, depressed, or hopeless? 1-->several days   Trouble falling or staying asleep, or sleeping too much?     Feeling tired or having little energy?     Poor appetite or overeating?     Feeling bad about yourself - or that you are a failure or have let yourself or your family down?     Trouble concentrating on things, such as reading the newspaper or watching television?     Moving or speaking so slowly that other people could have noticed? Or the opposite - being so fidgety or restless that you have been moving around a lot more than usual?    "  Thoughts that you would be better off dead, or of hurting yourself in some way?     PHQ-9 Total Score 1   If you checked off any problems, how difficult have these problems made it for you to do your work, take care of things at home, or get along with other people?             The following portions of the patient's history were reviewed and updated as appropriate: allergies, current medications, past family history, past medical history, past social history, past surgical history and problem list.    Past Medical History:   Diagnosis Date   • Anxiety    • Headache    • Hepatitis C    • Herpes    • Hidradenitis    • Hydradenitis    • Hypertension    • Obesity      Past Surgical History:   Procedure Laterality Date   •  SECTION     • CYST REMOVAL Right     wrist   • DENTAL PROCEDURE     • GANGLION CYST EXCISION     • WISDOM TOOTH EXTRACTION       Family History   Problem Relation Age of Onset   • Hypertension Father    • Heart disease Maternal Grandfather    • Diabetes Maternal Grandfather    • Hypertension Paternal Grandmother    • Breast cancer Other      Social History     Tobacco Use   • Smoking status: Every Day     Types: Electronic Cigarette     Passive exposure: Never   • Smokeless tobacco: Never   Substance Use Topics   • Alcohol use: Yes     Alcohol/week: 4.0 standard drinks     Types: 4 Glasses of wine per week     Comment: occasionaly       Current Outpatient Medications:   •  lamoTRIgine (LaMICtal) 25 MG tablet, TAKE 1 TABLET (25 MG) BY MOUTH DAILY FOR TWO WEEKS THEN TAKE 2 TABS DAILY (50MG) WEEKS 3 AND 4., Disp: , Rfl:     Objective   Vital Signs:   /81 (BP Location: Left arm, Patient Position: Sitting, Cuff Size: Large Adult)   Pulse 85   Ht 167.6 cm (66\")   Wt 98.8 kg (217 lb 12.8 oz)   SpO2 100%   BMI 35.15 kg/m²           Physical Exam  Vitals and nursing note reviewed.   Constitutional:       General: She is not in acute distress.     Appearance: She is " well-developed. She is not diaphoretic.   Neck:      Thyroid: No thyromegaly.      Vascular: No JVD.   Musculoskeletal:         General: No tenderness. Normal range of motion.   Skin:     General: Skin is warm and dry.   Neurological:      General: No focal deficit present.      Mental Status: She is alert and oriented to person, place, and time. Mental status is at baseline.      Sensory: No sensory deficit.   Psychiatric:         Attention and Perception: Attention normal.         Mood and Affect: Mood normal. Mood is not anxious or depressed. Affect is not labile.         Speech: Speech normal.         Behavior: Behavior normal. Behavior is cooperative.         Thought Content: Thought content normal.         Judgment: Judgment normal.          Result Review :     No visits with results within 7 Day(s) from this visit.   Latest known visit with results is:   Admission on 12/19/2022, Discharged on 12/19/2022   Component Date Value Ref Range Status   • POC Strep A, Molecular 12/19/2022 Positive (A)   Final   • Internal Control 12/19/2022 Passed   Final   • Lot Number 12/19/2022 [id number]   Final   • Expiration Date 12/19/2022 08/23/2024   Final                  Class 2 Severe Obesity (BMI >=35 and <=39.9). Obesity-related health conditions include the following: none. Obesity is improving with lifestyle modifications. BMI is is above average; BMI management plan is completed. We discussed low calorie, low carb based diet program, portion control and increasing exercise.           Assessment and Plan    Diagnoses and all orders for this visit:    1. Bipolar disorder, current episode mixed, moderate (HCC) (Primary)  Comments:  rec titrate lamictal to 50mg today  on 1/30 titrate to 50mg BID  send mychart msg w/ update in 2 wks   f/u in 4 weeks      2. Anxiety    3. Nonintractable headache, unspecified chronicity pattern, unspecified headache type  Comments:  Headache likely secondary to discontinuation of Prozac and new  initiation of Lamictal  Recommend Tylenol, ibuprofen or Excedrin as needed  Increase water intake    4. Class 2 obesity without serious comorbidity with body mass index (BMI) of 35.0 to 35.9 in adult, unspecified obesity type  Comments:  Wellbutrin is not recommended at this time with new initiation of Lamictal  rec exercise/walk 3 to 5 days/week  Decrease fatty fried and greasy foods  inc water      Will plan preventative labs with hemoglobin A1c and insulin level at follow-up appointment in 4 weeks      I spent 30 minutes caring for Ning Gomez on this date of service. This time includes time spent by me in the following activities: preparing for the visit, reviewing tests, performing a medically appropriate examination and/or evaluation , counseling and educating the patient/family/caregiver, ordering medications, tests, or procedures and documenting information in the medical record        Follow Up     Return in about 4 weeks (around 2/22/2023) for Recheck bipolar d/o, anxiety.  Patient was given instructions and counseling regarding her condition or for health maintenance advice. Please see specific information pulled into the AVS if appropriate.        Part of this note may be an electronic transcription/translation of spoken language to printed text using the Dragon Dictation System

## 2023-02-06 RX ORDER — LAMOTRIGINE 25 MG/1
50 TABLET ORAL 2 TIMES DAILY
Qty: 60 TABLET | Refills: 1 | Status: SHIPPED | OUTPATIENT
Start: 2023-02-06 | End: 2023-02-20 | Stop reason: SDUPTHER

## 2023-02-20 RX ORDER — LAMOTRIGINE 25 MG/1
50 TABLET ORAL 2 TIMES DAILY
Qty: 60 TABLET | Refills: 1 | Status: SHIPPED | OUTPATIENT
Start: 2023-02-20 | End: 2023-03-07 | Stop reason: SDUPTHER

## 2023-02-20 RX ORDER — VALACYCLOVIR HYDROCHLORIDE 1 G/1
1000 TABLET, FILM COATED ORAL DAILY
Qty: 30 TABLET | Refills: 0 | Status: SHIPPED | OUTPATIENT
Start: 2023-02-20 | End: 2023-03-14

## 2023-02-27 ENCOUNTER — OFFICE VISIT (OUTPATIENT)
Dept: FAMILY MEDICINE CLINIC | Facility: CLINIC | Age: 28
End: 2023-02-27
Payer: COMMERCIAL

## 2023-02-27 VITALS
DIASTOLIC BLOOD PRESSURE: 74 MMHG | OXYGEN SATURATION: 98 % | WEIGHT: 223 LBS | BODY MASS INDEX: 35.84 KG/M2 | SYSTOLIC BLOOD PRESSURE: 128 MMHG | HEIGHT: 66 IN | HEART RATE: 83 BPM

## 2023-02-27 DIAGNOSIS — Z00.00 PREVENTATIVE HEALTH CARE: ICD-10-CM

## 2023-02-27 DIAGNOSIS — R63.5 WEIGHT GAIN: ICD-10-CM

## 2023-02-27 DIAGNOSIS — I10 PRIMARY HYPERTENSION: Primary | ICD-10-CM

## 2023-02-27 DIAGNOSIS — F31.62 BIPOLAR DISORDER, CURRENT EPISODE MIXED, MODERATE: ICD-10-CM

## 2023-02-27 LAB — HBA1C MFR BLD: 5.2 % (ref 3.5–5.6)

## 2023-02-27 PROCEDURE — 80061 LIPID PANEL: CPT | Performed by: NURSE PRACTITIONER

## 2023-02-27 PROCEDURE — 84439 ASSAY OF FREE THYROXINE: CPT | Performed by: NURSE PRACTITIONER

## 2023-02-27 PROCEDURE — 80050 GENERAL HEALTH PANEL: CPT | Performed by: NURSE PRACTITIONER

## 2023-02-27 PROCEDURE — 83036 HEMOGLOBIN GLYCOSYLATED A1C: CPT | Performed by: NURSE PRACTITIONER

## 2023-02-27 PROCEDURE — 84481 FREE ASSAY (FT-3): CPT | Performed by: NURSE PRACTITIONER

## 2023-02-27 PROCEDURE — 99214 OFFICE O/P EST MOD 30 MIN: CPT | Performed by: NURSE PRACTITIONER

## 2023-02-27 NOTE — ASSESSMENT & PLAN NOTE
1.  Labs today  2.  Could be related to previous Prozac use  3.  Could also be related to recent uncontrolled depression and anxiety  4.  Recommended high-fiber, high-protein and low-carb diet  5.  Regular exercise

## 2023-02-27 NOTE — PROGRESS NOTES
"Chief Complaint  Follow-up (4 week follow up bipolar disorder and anxiety )    Subjective        Ning Gomez presents to [facility]  History of Present Illness    Patient presents for follow-up visit regarding bipolar disorder.  She was seen approximately 1 month ago with complaints of feeling emotionless, overwhelmed and having frequent mood swings with Prozac.  She was started on Lamictal 25 mg daily which has been increased to 50 mg twice daily.  Patient is taking as prescribed with no complications.  She reports mood has improved significantly.  Patient has had a couple instances of rapid mood fluctuations but feels significantly better on current medication regimen.    Patient also concerned about weight gain, describes a 20 pound weight gain in approximately 2 months.  She denies any lifestyle changes to contribute.    Objective   Vital Signs:  /74 (BP Location: Left arm, Patient Position: Sitting, Cuff Size: Adult)   Pulse 83   Ht 167.6 cm (66\")   Wt 101 kg (223 lb)   SpO2 98%   BMI 35.99 kg/m²   Estimated body mass index is 35.99 kg/m² as calculated from the following:    Height as of this encounter: 167.6 cm (66\").    Weight as of this encounter: 101 kg (223 lb).       Class 2 Severe Obesity (BMI >=35 and <=39.9). Obesity-related health conditions include the following: hypertension. Obesity is worsening. BMI is is above average; BMI management plan is completed. We discussed portion control, increasing exercise and management of depression/anxiety/stress to control compensatory eating.      Physical Exam  Constitutional:       Appearance: Normal appearance.   HENT:      Head: Normocephalic.   Cardiovascular:      Rate and Rhythm: Normal rate and regular rhythm.   Pulmonary:      Effort: Pulmonary effort is normal.      Breath sounds: Normal breath sounds.   Abdominal:      General: Abdomen is flat. Bowel sounds are normal.      Palpations: Abdomen is soft. "   Musculoskeletal:         General: Normal range of motion.      Cervical back: Neck supple.      Right lower leg: No edema.      Left lower leg: No edema.   Skin:     General: Skin is warm and dry.   Neurological:      Mental Status: She is alert and oriented to person, place, and time.      Gait: Gait is intact.   Psychiatric:         Attention and Perception: Attention normal.         Mood and Affect: Mood normal.         Speech: Speech normal.        Result Review :                   Assessment and Plan   Diagnoses and all orders for this visit:    1. Primary hypertension (Primary)  Assessment & Plan:  1.  Blood pressure is at goal of less than 140/90  2.  Currently managed with diet and exercise    Orders:  -     CBC (No Diff)  -     Comprehensive metabolic panel  -     TSH  -     T3, free  -     T4, free  -     Hemoglobin A1c  -     Lipid panel    2. Preventative health care  -     CBC (No Diff)  -     Comprehensive metabolic panel  -     TSH  -     T3, free  -     T4, free  -     Hemoglobin A1c  -     Lipid panel    3. Bipolar disorder, current episode mixed, moderate (HCC)  Assessment & Plan:  1.  Stable on Lamictal 50 mg twice daily      4. Weight gain  Assessment & Plan:  1.  Labs today  2.  Could be related to previous Prozac use  3.  Could also be related to recent uncontrolled depression and anxiety  4.  Recommended high-fiber, high-protein and low-carb diet  5.  Regular exercise           I spent 30 minutes caring for Ning on this date of service. This time includes time spent by me in the following activities:preparing for the visit, obtaining and/or reviewing a separately obtained history, performing a medically appropriate examination and/or evaluation , counseling and educating the patient/family/caregiver, ordering medications, tests, or procedures, documenting information in the medical record and care coordination  Follow Up   Return in about 8 weeks (around 4/24/2023) for BPD/Weight  Gain.  Patient was given instructions and counseling regarding her condition or for health maintenance advice. Please see specific information pulled into the AVS if appropriate.

## 2023-02-27 NOTE — PROGRESS NOTES
Venipuncture Blood Specimen Collection  Venipuncture performed in the left arm by Trini Horne MA with good hemostasis. Patient tolerated the procedure well without complications.   02/27/23   Trini Horne MA

## 2023-02-28 LAB
ALBUMIN SERPL-MCNC: 4.5 G/DL (ref 3.5–5.2)
ALBUMIN/GLOB SERPL: 1.7 G/DL
ALP SERPL-CCNC: 49 U/L (ref 39–117)
ALT SERPL W P-5'-P-CCNC: 9 U/L (ref 1–33)
ANION GAP SERPL CALCULATED.3IONS-SCNC: 10.1 MMOL/L (ref 5–15)
AST SERPL-CCNC: 14 U/L (ref 1–32)
BILIRUB SERPL-MCNC: 0.5 MG/DL (ref 0–1.2)
BUN SERPL-MCNC: 14 MG/DL (ref 6–20)
BUN/CREAT SERPL: 22.2 (ref 7–25)
CALCIUM SPEC-SCNC: 9.2 MG/DL (ref 8.6–10.5)
CHLORIDE SERPL-SCNC: 103 MMOL/L (ref 98–107)
CHOLEST SERPL-MCNC: 174 MG/DL (ref 0–200)
CO2 SERPL-SCNC: 26.9 MMOL/L (ref 22–29)
CREAT SERPL-MCNC: 0.63 MG/DL (ref 0.57–1)
DEPRECATED RDW RBC AUTO: 38.9 FL (ref 37–54)
EGFRCR SERPLBLD CKD-EPI 2021: 124.1 ML/MIN/1.73
ERYTHROCYTE [DISTWIDTH] IN BLOOD BY AUTOMATED COUNT: 11.8 % (ref 12.3–15.4)
GLOBULIN UR ELPH-MCNC: 2.7 GM/DL
GLUCOSE SERPL-MCNC: 94 MG/DL (ref 65–99)
HCT VFR BLD AUTO: 39.9 % (ref 34–46.6)
HDLC SERPL-MCNC: 49 MG/DL (ref 40–60)
HGB BLD-MCNC: 13 G/DL (ref 12–15.9)
LDLC SERPL CALC-MCNC: 114 MG/DL (ref 0–100)
LDLC/HDLC SERPL: 2.31 {RATIO}
MCH RBC QN AUTO: 29.5 PG (ref 26.6–33)
MCHC RBC AUTO-ENTMCNC: 32.6 G/DL (ref 31.5–35.7)
MCV RBC AUTO: 90.5 FL (ref 79–97)
PLATELET # BLD AUTO: 349 10*3/MM3 (ref 140–450)
PMV BLD AUTO: 10.2 FL (ref 6–12)
POTASSIUM SERPL-SCNC: 4.6 MMOL/L (ref 3.5–5.2)
PROT SERPL-MCNC: 7.2 G/DL (ref 6–8.5)
RBC # BLD AUTO: 4.41 10*6/MM3 (ref 3.77–5.28)
SODIUM SERPL-SCNC: 140 MMOL/L (ref 136–145)
T3FREE SERPL-MCNC: 3.55 PG/ML (ref 2–4.4)
T4 FREE SERPL-MCNC: 1.12 NG/DL (ref 0.93–1.7)
TRIGL SERPL-MCNC: 58 MG/DL (ref 0–150)
TSH SERPL DL<=0.05 MIU/L-ACNC: 1.49 UIU/ML (ref 0.27–4.2)
VLDLC SERPL-MCNC: 11 MG/DL (ref 5–40)
WBC NRBC COR # BLD: 6.45 10*3/MM3 (ref 3.4–10.8)

## 2023-03-02 RX ORDER — LAMOTRIGINE 25 MG/1
TABLET ORAL
Qty: 60 TABLET | Refills: 1 | OUTPATIENT
Start: 2023-03-02

## 2023-03-08 RX ORDER — LAMOTRIGINE 25 MG/1
50 TABLET ORAL 2 TIMES DAILY
Qty: 60 TABLET | Refills: 1 | Status: SHIPPED | OUTPATIENT
Start: 2023-03-08

## 2023-03-14 RX ORDER — VALACYCLOVIR HYDROCHLORIDE 1 G/1
TABLET, FILM COATED ORAL
Qty: 30 TABLET | Refills: 0 | Status: SHIPPED | OUTPATIENT
Start: 2023-03-14

## 2023-03-17 RX ORDER — LAMOTRIGINE 25 MG/1
TABLET ORAL
Qty: 60 TABLET | Refills: 1 | OUTPATIENT
Start: 2023-03-17

## 2023-04-12 RX ORDER — LAMOTRIGINE 25 MG/1
TABLET ORAL
Qty: 60 TABLET | Refills: 1 | Status: SHIPPED | OUTPATIENT
Start: 2023-04-12

## 2023-04-19 ENCOUNTER — TELEPHONE (OUTPATIENT)
Dept: FAMILY MEDICINE CLINIC | Facility: CLINIC | Age: 28
End: 2023-04-19
Payer: COMMERCIAL

## 2023-04-19 RX ORDER — MIDODRINE HYDROCHLORIDE 10 MG/1
10 TABLET ORAL 3 TIMES DAILY PRN
Qty: 30 TABLET | Refills: 0 | Status: SHIPPED | OUTPATIENT
Start: 2023-04-19

## 2023-04-19 NOTE — TELEPHONE ENCOUNTER
Pt notified and she is wanting to try the pill form to see if it helps for their drive to [place].

## 2023-04-19 NOTE — TELEPHONE ENCOUNTER
We do not have any injections for motion sickness. Treatment options are typically Midodrine or scopolamine.  Midodrine is a pill that you take as needed for motion sickness and scopolamine is a patch that you wear for 3 days at a time.

## 2023-04-19 NOTE — TELEPHONE ENCOUNTER
Pt notified and she also scheduled follow up for tomorrow so she can discuss medication before going on vacation.

## 2023-04-19 NOTE — TELEPHONE ENCOUNTER
Caller: Ning Gomez    Relationship to patient: Self    Best call back number: [phone]    Patient is needing:     DUE TO A CONFLICT I HAD TO CANCEL MY 8 WEEK FOLLOW UP.    I TRIED TO RESCHEDULE BUT THERE WASN'T ANY APPTS BEFORE I LEAVE FOR VACATIONS ON 5.20.23.    I WOULD LIKE TO GET A MOTION SICKNESS INJECTION BEFORE I LEAVE AS DRAMAMINE DOES NOT HELP, IF THAT'S POSSIBLE.

## 2023-04-20 ENCOUNTER — OFFICE VISIT (OUTPATIENT)
Dept: FAMILY MEDICINE CLINIC | Facility: CLINIC | Age: 28
End: 2023-04-20
Payer: COMMERCIAL

## 2023-04-20 VITALS
DIASTOLIC BLOOD PRESSURE: 70 MMHG | OXYGEN SATURATION: 99 % | BODY MASS INDEX: 35.52 KG/M2 | HEART RATE: 65 BPM | SYSTOLIC BLOOD PRESSURE: 124 MMHG | WEIGHT: 221 LBS | HEIGHT: 66 IN

## 2023-04-20 DIAGNOSIS — F41.9 ANXIETY: ICD-10-CM

## 2023-04-20 DIAGNOSIS — G43.809 OTHER MIGRAINE WITHOUT STATUS MIGRAINOSUS, NOT INTRACTABLE: ICD-10-CM

## 2023-04-20 DIAGNOSIS — F31.62 BIPOLAR DISORDER, CURRENT EPISODE MIXED, MODERATE: Primary | ICD-10-CM

## 2023-04-20 PROCEDURE — 99214 OFFICE O/P EST MOD 30 MIN: CPT | Performed by: NURSE PRACTITIONER

## 2023-04-20 RX ORDER — AMITRIPTYLINE HYDROCHLORIDE 25 MG/1
25 TABLET, FILM COATED ORAL NIGHTLY
Qty: 30 TABLET | Refills: 1 | Status: SHIPPED | OUTPATIENT
Start: 2023-04-20

## 2023-04-20 RX ORDER — LISINOPRIL 5 MG/1
1 TABLET ORAL DAILY
COMMUNITY
Start: 2023-03-27

## 2023-04-20 RX ORDER — BUSPIRONE HYDROCHLORIDE 5 MG/1
5 TABLET ORAL 2 TIMES DAILY PRN
Qty: 60 TABLET | Refills: 0 | Status: SHIPPED | OUTPATIENT
Start: 2023-04-20

## 2023-04-20 NOTE — ASSESSMENT & PLAN NOTE
1.  Start amitriptyline 25 mg daily  2.  Nurtec 75 mg as needed  3.  Keep log of headaches over the next month

## 2023-04-20 NOTE — PROGRESS NOTES
"Chief Complaint  Follow-up (8 week follow up bipolar disorder ) and Headache (Has headaches 5 out of 7 days for the last several months. )    Subjective        Ning Gomez presents to [facility]  History of Present Illness    Patient presents for f/u visit.     Patient has history of mixed BPD - previously had complaints of feeling emotionless, overwhelmed and having frequent mood swings, worse with Prozac.  She was started on Lamictal 25 mg daily which has been increased to 50 mg twice daily.  Patient is taking as prescribed with no complications.  She reports mood is varying.  Patient does describe increased anxiety, worse at home.     Patient also c/o frequent headaches, up to 5 days weekly. She takes Tylenol, helps temporarily. No known exacerbating factors. Patient has UTD eye exam.  She denies dizziness, vision changes, syncope or other accompanying factors.     Objective   Vital Signs:  /70 (BP Location: Left arm, Patient Position: Sitting, Cuff Size: Adult)   Pulse 65   Ht 167.6 cm (66\")   Wt 100 kg (221 lb)   SpO2 99%   BMI 35.67 kg/m²   Estimated body mass index is 35.67 kg/m² as calculated from the following:    Height as of this encounter: 167.6 cm (66\").    Weight as of this encounter: 100 kg (221 lb).             Physical Exam  Constitutional:       Appearance: Normal appearance.   HENT:      Head: Normocephalic.   Cardiovascular:      Rate and Rhythm: Normal rate and regular rhythm.   Pulmonary:      Effort: Pulmonary effort is normal.      Breath sounds: Normal breath sounds.   Abdominal:      General: Abdomen is flat. Bowel sounds are normal.      Palpations: Abdomen is soft.   Musculoskeletal:         General: Normal range of motion.      Cervical back: Neck supple.      Right lower leg: No edema.      Left lower leg: No edema.   Skin:     General: Skin is warm and dry.   Neurological:      Mental Status: She is alert and oriented to person, place, and " time.      Gait: Gait is intact.   Psychiatric:         Attention and Perception: Attention normal.         Mood and Affect: Mood normal.         Speech: Speech normal.        Result Review :    CMP        2/27/2023    09:04   CMP   Glucose 94     BUN 14     Creatinine 0.63     EGFR 124.1     Sodium 140     Potassium 4.6     Chloride 103     Calcium 9.2     Total Protein 7.2     Albumin 4.5     Globulin 2.7     Total Bilirubin 0.5     Alkaline Phosphatase 49     AST (SGOT) 14     ALT (SGPT) 9     Albumin/Globulin Ratio 1.7     BUN/Creatinine Ratio 22.2     Anion Gap 10.1       CBC        2/27/2023    09:04   CBC   WBC 6.45     RBC 4.41     Hemoglobin 13.0     Hematocrit 39.9     MCV 90.5     MCH 29.5     MCHC 32.6     RDW 11.8     Platelets 349       Lipid Panel        2/27/2023    09:04   Lipid Panel   Total Cholesterol 174     Triglycerides 58     HDL Cholesterol 49     VLDL Cholesterol 11     LDL Cholesterol  114     LDL/HDL Ratio 2.31       TSH        2/27/2023    09:04   TSH   TSH 1.490       Most Recent A1C        2/27/2023    09:04   HGBA1C Most Recent   Hemoglobin A1C 5.2                    Assessment and Plan   Diagnoses and all orders for this visit:    1. Bipolar disorder, current episode mixed, moderate (Primary)  Assessment & Plan:  1.  Continue Lamictal 50 mg twice daily        2. Anxiety  Assessment & Plan:  1.  Start BuSpar 5 mg 1-2 times daily as needed      3. Other migraine without status migrainosus, not intractable  Assessment & Plan:  1.  Start amitriptyline 25 mg daily  2.  Nurtec 75 mg as needed  3.  Keep log of headaches over the next month      Other orders  -     amitriptyline (ELAVIL) 25 MG tablet; Take 1 tablet by mouth Every Night.  Dispense: 30 tablet; Refill: 1  -     busPIRone (BUSPAR) 5 MG tablet; Take 1 tablet by mouth 2 (Two) Times a Day As Needed (anxiety).  Dispense: 60 tablet; Refill: 0         I spent 30 minutes caring for Ning on this date of service. This time includes time  spent by me in the following activities:preparing for the visit, obtaining and/or reviewing a separately obtained history, performing a medically appropriate examination and/or evaluation , counseling and educating the patient/family/caregiver, ordering medications, tests, or procedures, documenting information in the medical record and care coordination  Follow Up   Return in about 6 weeks (around 6/1/2023) for Migraines/BPD.  Patient was given instructions and counseling regarding her condition or for health maintenance advice. Please see specific information pulled into the AVS if appropriate.

## 2023-04-27 RX ORDER — LAMOTRIGINE 25 MG/1
TABLET ORAL
Qty: 60 TABLET | Refills: 1 | OUTPATIENT
Start: 2023-04-27

## 2023-05-10 RX ORDER — SCOLOPAMINE TRANSDERMAL SYSTEM 1 MG/1
1 PATCH, EXTENDED RELEASE TRANSDERMAL
Qty: 4 PATCH | Refills: 0 | Status: SHIPPED | OUTPATIENT
Start: 2023-05-10 | End: 2023-05-20

## 2023-05-12 RX ORDER — AMITRIPTYLINE HYDROCHLORIDE 25 MG/1
TABLET, FILM COATED ORAL
Qty: 30 TABLET | Refills: 1 | Status: SHIPPED | OUTPATIENT
Start: 2023-05-12

## 2023-05-12 RX ORDER — BUSPIRONE HYDROCHLORIDE 5 MG/1
5 TABLET ORAL 2 TIMES DAILY PRN
Qty: 60 TABLET | Refills: 2 | Status: SHIPPED | OUTPATIENT
Start: 2023-05-12

## 2023-05-23 RX ORDER — LAMOTRIGINE 25 MG/1
TABLET ORAL
Qty: 60 TABLET | Refills: 1 | Status: SHIPPED | OUTPATIENT
Start: 2023-05-23

## 2023-06-01 ENCOUNTER — OFFICE VISIT (OUTPATIENT)
Dept: FAMILY MEDICINE CLINIC | Facility: CLINIC | Age: 28
End: 2023-06-01

## 2023-06-01 VITALS
HEART RATE: 102 BPM | SYSTOLIC BLOOD PRESSURE: 124 MMHG | HEIGHT: 66 IN | BODY MASS INDEX: 35.52 KG/M2 | OXYGEN SATURATION: 98 % | DIASTOLIC BLOOD PRESSURE: 70 MMHG | WEIGHT: 221 LBS

## 2023-06-01 DIAGNOSIS — F41.9 ANXIETY: ICD-10-CM

## 2023-06-01 DIAGNOSIS — G43.809 OTHER MIGRAINE WITHOUT STATUS MIGRAINOSUS, NOT INTRACTABLE: ICD-10-CM

## 2023-06-01 DIAGNOSIS — F31.62 BIPOLAR DISORDER, CURRENT EPISODE MIXED, MODERATE: Primary | ICD-10-CM

## 2023-06-01 PROCEDURE — 99213 OFFICE O/P EST LOW 20 MIN: CPT | Performed by: NURSE PRACTITIONER

## 2023-06-01 NOTE — PROGRESS NOTES
"Chief Complaint  Follow-up (6 week follow up BPD/migraines)    Subjective        Ning Gomez presents to [facility]  History of Present Illness    Patient presents for f/u visit.     Patient has history of mixed BPD - previously had complaints of feeling emotionless, overwhelmed and having frequent mood swings, worse with Prozac.  Symptoms are stable on Lamictal 50 mg BID. Patient is taking Buspar 5 mg BID PRN    Patient has a history of migraines, started on Elavil 25 mg nightly. She denies any migraines since starting medication. Symptoms are well controlled.     Objective   Vital Signs:  /70 (BP Location: Left arm, Patient Position: Sitting, Cuff Size: Large Adult)   Pulse 102   Ht 167.6 cm (66\")   Wt 100 kg (221 lb)   SpO2 98%   BMI 35.67 kg/m²   Estimated body mass index is 35.67 kg/m² as calculated from the following:    Height as of this encounter: 167.6 cm (66\").    Weight as of this encounter: 100 kg (221 lb).             Physical Exam  Constitutional:       Appearance: Normal appearance.   HENT:      Head: Normocephalic.   Cardiovascular:      Rate and Rhythm: Normal rate and regular rhythm.   Pulmonary:      Effort: Pulmonary effort is normal.      Breath sounds: Normal breath sounds.   Abdominal:      General: Abdomen is flat. Bowel sounds are normal.      Palpations: Abdomen is soft.   Musculoskeletal:         General: Normal range of motion.      Cervical back: Neck supple.      Right lower leg: No edema.      Left lower leg: No edema.   Skin:     General: Skin is warm and dry.   Neurological:      Mental Status: She is alert and oriented to person, place, and time.      Gait: Gait is intact.   Psychiatric:         Attention and Perception: Attention normal.         Mood and Affect: Mood normal.         Speech: Speech normal.        Result Review :                   Assessment and Plan   Diagnoses and all orders for this visit:    1. Bipolar disorder, " current episode mixed, moderate (Primary)  Assessment & Plan:  1. Stable on Lamictal 50 mg BID      2. Anxiety  Assessment & Plan:  1. Stable on Buspar 5 mg BID PRN      3. Other migraine without status migrainosus, not intractable  Assessment & Plan:  1. Stable on Elavil 25 mg nightly               I spent 20 minutes caring for Ning on this date of service. This time includes time spent by me in the following activities:preparing for the visit, obtaining and/or reviewing a separately obtained history, performing a medically appropriate examination and/or evaluation , counseling and educating the patient/family/caregiver, ordering medications, tests, or procedures, documenting information in the medical record and care coordination  Follow Up   Return in about 3 months (around 9/1/2023) for BPD/Migraines.  Patient was given instructions and counseling regarding her condition or for health maintenance advice. Please see specific information pulled into the AVS if appropriate.

## 2023-06-06 RX ORDER — AMITRIPTYLINE HYDROCHLORIDE 25 MG/1
TABLET, FILM COATED ORAL
Qty: 30 TABLET | Refills: 1 | Status: SHIPPED | OUTPATIENT
Start: 2023-06-06

## 2023-08-11 RX ORDER — LAMOTRIGINE 25 MG/1
TABLET ORAL
Qty: 60 TABLET | Refills: 1 | Status: SHIPPED | OUTPATIENT
Start: 2023-08-11

## 2023-08-22 ENCOUNTER — HOSPITAL ENCOUNTER (EMERGENCY)
Facility: HOSPITAL | Age: 28
Discharge: HOME | End: 2023-08-22
Attending: EMERGENCY MEDICINE | Admitting: EMERGENCY MEDICINE
Payer: COMMERCIAL

## 2023-08-22 ENCOUNTER — APPOINTMENT (OUTPATIENT)
Dept: ULTRASOUND IMAGING | Facility: HOSPITAL | Age: 28
End: 2023-08-22
Payer: COMMERCIAL

## 2023-08-22 VITALS
BODY MASS INDEX: 36.07 KG/M2 | DIASTOLIC BLOOD PRESSURE: 92 MMHG | HEIGHT: 66 IN | SYSTOLIC BLOOD PRESSURE: 138 MMHG | TEMPERATURE: 98.1 F | RESPIRATION RATE: 18 BRPM | WEIGHT: 224.43 LBS | HEART RATE: 98 BPM | OXYGEN SATURATION: 100 %

## 2023-08-22 DIAGNOSIS — Z3A.01 LESS THAN 8 WEEKS GESTATION OF PREGNANCY: ICD-10-CM

## 2023-08-22 DIAGNOSIS — R10.30 LOWER ABDOMINAL PAIN: Primary | ICD-10-CM

## 2023-08-22 LAB
ABO GROUP BLD: NORMAL
ALBUMIN SERPL-MCNC: 4.6 G/DL (ref 3.5–5.2)
ALBUMIN/GLOB SERPL: 1.8 G/DL
ALP SERPL-CCNC: 48 U/L (ref 39–117)
ALT SERPL W P-5'-P-CCNC: 18 U/L (ref 1–33)
AMPHET+METHAMPHET UR QL: NEGATIVE
ANION GAP SERPL CALCULATED.3IONS-SCNC: 12 MMOL/L (ref 5–15)
AST SERPL-CCNC: 20 U/L (ref 1–32)
BACTERIA UR QL AUTO: ABNORMAL /HPF
BARBITURATES UR QL SCN: NEGATIVE
BASOPHILS # BLD AUTO: 0.1 10*3/MM3 (ref 0–0.2)
BASOPHILS NFR BLD AUTO: 0.6 % (ref 0–1.5)
BENZODIAZ UR QL SCN: NEGATIVE
BILIRUB SERPL-MCNC: 0.4 MG/DL (ref 0–1.2)
BILIRUB UR QL STRIP: NEGATIVE
BUN SERPL-MCNC: 14 MG/DL (ref 6–20)
BUN/CREAT SERPL: 23 (ref 7–25)
CALCIUM SPEC-SCNC: 9.6 MG/DL (ref 8.6–10.5)
CANNABINOIDS SERPL QL: NEGATIVE
CHLORIDE SERPL-SCNC: 102 MMOL/L (ref 98–107)
CLARITY UR: CLEAR
CO2 SERPL-SCNC: 26 MMOL/L (ref 22–29)
COCAINE UR QL: NEGATIVE
COLOR UR: ABNORMAL
CREAT SERPL-MCNC: 0.61 MG/DL (ref 0.57–1)
DEPRECATED RDW RBC AUTO: 43.8 FL (ref 37–54)
EGFRCR SERPLBLD CKD-EPI 2021: 125.1 ML/MIN/1.73
EOSINOPHIL # BLD AUTO: 0.1 10*3/MM3 (ref 0–0.4)
EOSINOPHIL NFR BLD AUTO: 1.3 % (ref 0.3–6.2)
ERYTHROCYTE [DISTWIDTH] IN BLOOD BY AUTOMATED COUNT: 13 % (ref 12.3–15.4)
GLOBULIN UR ELPH-MCNC: 2.5 GM/DL
GLUCOSE SERPL-MCNC: 95 MG/DL (ref 65–99)
GLUCOSE UR STRIP-MCNC: NEGATIVE MG/DL
HCG INTACT+B SERPL-ACNC: 1173 MIU/ML
HCT VFR BLD AUTO: 37.2 % (ref 34–46.6)
HGB BLD-MCNC: 12.6 G/DL (ref 12–15.9)
HGB UR QL STRIP.AUTO: NEGATIVE
HOLD SPECIMEN: NORMAL
HOLD SPECIMEN: NORMAL
HYALINE CASTS UR QL AUTO: ABNORMAL /LPF
KETONES UR QL STRIP: ABNORMAL
LEUKOCYTE ESTERASE UR QL STRIP.AUTO: ABNORMAL
LYMPHOCYTES # BLD AUTO: 2.7 10*3/MM3 (ref 0.7–3.1)
LYMPHOCYTES NFR BLD AUTO: 31.1 % (ref 19.6–45.3)
MCH RBC QN AUTO: 30.7 PG (ref 26.6–33)
MCHC RBC AUTO-ENTMCNC: 33.8 G/DL (ref 31.5–35.7)
MCV RBC AUTO: 90.9 FL (ref 79–97)
METHADONE UR QL SCN: NEGATIVE
MONOCYTES # BLD AUTO: 0.5 10*3/MM3 (ref 0.1–0.9)
MONOCYTES NFR BLD AUTO: 6.3 % (ref 5–12)
NEUTROPHILS NFR BLD AUTO: 5.2 10*3/MM3 (ref 1.7–7)
NEUTROPHILS NFR BLD AUTO: 60.7 % (ref 42.7–76)
NITRITE UR QL STRIP: NEGATIVE
NRBC BLD AUTO-RTO: 0.1 /100 WBC (ref 0–0.2)
NUMBER OF DOSES: NORMAL
OPIATES UR QL: NEGATIVE
OXYCODONE UR QL SCN: NEGATIVE
PH UR STRIP.AUTO: 5.5 [PH] (ref 5–8)
PLATELET # BLD AUTO: 347 10*3/MM3 (ref 140–450)
PMV BLD AUTO: 7.4 FL (ref 6–12)
POTASSIUM SERPL-SCNC: 3.5 MMOL/L (ref 3.5–5.2)
PROT SERPL-MCNC: 7.1 G/DL (ref 6–8.5)
PROT UR QL STRIP: NEGATIVE
RBC # BLD AUTO: 4.1 10*6/MM3 (ref 3.77–5.28)
RBC # UR STRIP: ABNORMAL /HPF
REF LAB TEST METHOD: ABNORMAL
RH BLD: POSITIVE
SODIUM SERPL-SCNC: 140 MMOL/L (ref 136–145)
SP GR UR STRIP: 1.03 (ref 1–1.03)
SQUAMOUS #/AREA URNS HPF: ABNORMAL /HPF
UROBILINOGEN UR QL STRIP: ABNORMAL
WBC # UR STRIP: ABNORMAL /HPF
WBC NRBC COR # BLD: 8.6 10*3/MM3 (ref 3.4–10.8)
WHOLE BLOOD HOLD COAG: NORMAL
WHOLE BLOOD HOLD SPECIMEN: NORMAL

## 2023-08-22 PROCEDURE — 84702 CHORIONIC GONADOTROPIN TEST: CPT | Performed by: NURSE PRACTITIONER

## 2023-08-22 PROCEDURE — 80307 DRUG TEST PRSMV CHEM ANLYZR: CPT | Performed by: NURSE PRACTITIONER

## 2023-08-22 PROCEDURE — 81001 URINALYSIS AUTO W/SCOPE: CPT | Performed by: NURSE PRACTITIONER

## 2023-08-22 PROCEDURE — 86901 BLOOD TYPING SEROLOGIC RH(D): CPT | Performed by: NURSE PRACTITIONER

## 2023-08-22 PROCEDURE — 76817 TRANSVAGINAL US OBSTETRIC: CPT

## 2023-08-22 PROCEDURE — 93976 VASCULAR STUDY: CPT

## 2023-08-22 PROCEDURE — 80053 COMPREHEN METABOLIC PANEL: CPT | Performed by: NURSE PRACTITIONER

## 2023-08-22 PROCEDURE — 85025 COMPLETE CBC W/AUTO DIFF WBC: CPT | Performed by: NURSE PRACTITIONER

## 2023-08-22 PROCEDURE — 76801 OB US < 14 WKS SINGLE FETUS: CPT

## 2023-08-22 PROCEDURE — 99284 EMERGENCY DEPT VISIT MOD MDM: CPT

## 2023-08-22 PROCEDURE — 86900 BLOOD TYPING SEROLOGIC ABO: CPT | Performed by: NURSE PRACTITIONER

## 2023-08-22 RX ORDER — SODIUM CHLORIDE 0.9 % (FLUSH) 0.9 %
10 SYRINGE (ML) INJECTION AS NEEDED
Status: DISCONTINUED | OUTPATIENT
Start: 2023-08-22 | End: 2023-08-22 | Stop reason: HOSPADM

## 2023-08-22 NOTE — ED PROVIDER NOTES
Subjective   Provider in Triage Note  27-year-old female presents ambulatory to triage with complaints of right suprapubic lower quadrant pain for the last week.  She states she has a prior history of nonsurgical ectopic pregnancy several years ago on the right side.  She denies any vaginal bleeding.  Denies any nausea or vomiting.  No syncope.  Has not yet established pregnancy.  Prior abdominal surgeries:     Lmp   a- ectopic    Labs urine ordered from triage    History of Present Illness  I have read the above note written by previous provider in triage and says it is accurate for patient's HPI.    Review of Systems   Constitutional:  Negative for fever.   HENT:  Negative for congestion and rhinorrhea.    Respiratory:  Negative for shortness of breath.    Cardiovascular:  Negative for chest pain.   Gastrointestinal:  Negative for abdominal pain.   Genitourinary:  Positive for pelvic pain. Negative for dysuria, vaginal discharge and vaginal pain.   Musculoskeletal:  Negative for arthralgias and myalgias.   Neurological:  Negative for dizziness and syncope.   Psychiatric/Behavioral:  Negative for confusion. The patient is not nervous/anxious.    All other systems reviewed and are negative.    Past Medical History:   Diagnosis Date    Anxiety 2017    Headache 2018    Hepatitis C     Herpes     Hidradenitis     Hydradenitis     Hypertension 2020    Obesity        No Known Allergies    Past Surgical History:   Procedure Laterality Date     SECTION      CYST REMOVAL Right     wrist    DENTAL PROCEDURE      GANGLION CYST EXCISION  2014    WISDOM TOOTH EXTRACTION  2011       Family History   Problem Relation Age of Onset    Hypertension Father     Heart disease Maternal Grandfather     Diabetes Maternal Grandfather     Hypertension Paternal Grandmother     Breast cancer Other        Social History     Socioeconomic History    Marital status: Single   Tobacco Use    Smoking status: Every Day      Types: Electronic Cigarette     Passive exposure: Never    Smokeless tobacco: Never   Vaping Use    Vaping Use: Every day    Substances: Nicotine    Devices: Disposable    Passive vaping exposure: Yes   Substance and Sexual Activity    Alcohol use: Yes     Alcohol/week: 4.0 standard drinks     Types: 4 Glasses of wine per week     Comment: occasionaly    Drug use: Yes     Types: Cocaine(coke), Heroin, Methamphetamines     Comment: Clean date 4/20/16    Sexual activity: Yes     Partners: Male           Objective   Physical Exam  Vitals and nursing note reviewed.   Constitutional:       General: She is awake. She is not in acute distress.     Appearance: Normal appearance. She is well-developed. She is not diaphoretic.   HENT:      Head: Normocephalic and atraumatic.      Right Ear: Tympanic membrane, ear canal and external ear normal.      Left Ear: Tympanic membrane, ear canal and external ear normal.   Eyes:      Extraocular Movements: Extraocular movements intact.      Pupils: Pupils are equal, round, and reactive to light.   Cardiovascular:      Rate and Rhythm: Normal rate and regular rhythm.      Pulses: Normal pulses.      Heart sounds: Normal heart sounds. No murmur heard.  Pulmonary:      Effort: Pulmonary effort is normal.      Breath sounds: Normal breath sounds.   Abdominal:      General: Abdomen is protuberant. Bowel sounds are decreased.      Palpations: Abdomen is soft.      Tenderness: There is no abdominal tenderness.   Musculoskeletal:         General: Normal range of motion.      Cervical back: Normal range of motion and neck supple.   Skin:     General: Skin is warm and dry.      Capillary Refill: Capillary refill takes less than 2 seconds.   Neurological:      General: No focal deficit present.      Mental Status: She is alert and oriented to person, place, and time. Mental status is at baseline.      GCS: GCS eye subscore is 4. GCS verbal subscore is 5. GCS motor subscore is 6.      Cranial  "Nerves: Cranial nerves 2-12 are intact.      Sensory: Sensation is intact.      Motor: Motor function is intact.      Deep Tendon Reflexes: Reflexes are normal and symmetric.   Psychiatric:         Mood and Affect: Mood normal.         Behavior: Behavior normal. Behavior is cooperative.       Procedures           ED Course      /92 (BP Location: Left arm, Patient Position: Sitting)   Pulse 98   Temp 98.1 øF (36.7 øC) (Oral)   Resp 18   Ht 167.6 cm (66\")   Wt 102 kg (224 lb 6.9 oz)   LMP 07/17/2023   SpO2 100%   BMI 36.22 kg/mý     .edlabs.edmeds  US Ob < 14 Weeks Single or First Gestation    Result Date: 8/22/2023  Impression: Normal appearance of the uterus and ovaries. An intrauterine gestation is not identified. This could reflect early pregnancy which is too small to visualize; a nonvisualized ectopic pregnancy cannot be entirely excluded. Please correlate with LMP and beta hCG. Consider repeat imaging as clinically warranted. Electronically Signed: Kirt Gasca  8/22/2023 8:39 PM EDT  Workstation ID: [id number]    US Ob Transvaginal    Result Date: 8/22/2023  Impression: Normal appearance of the uterus and ovaries. An intrauterine gestation is not identified. This could reflect early pregnancy which is too small to visualize; a nonvisualized ectopic pregnancy cannot be entirely excluded. Please correlate with LMP and beta hCG. Consider repeat imaging as clinically warranted. Electronically Signed: Kirt Gasca  8/22/2023 8:39 PM EDT  Workstation ID: [id number]                                        Medical Decision Making  Problems Addressed:  Less than 8 weeks gestation of pregnancy: acute illness or injury  Lower abdominal pain: acute illness or injury    Amount and/or Complexity of Data Reviewed  Labs: ordered. Decision-making details documented in ED Course.  Radiology: ordered. Decision-making details documented in ED Course.    Risk  Prescription drug management.    Patient is a pleasant " 27-year-old obese  female with history of ectopic pregnancy who presents the emergency room with complaints of lower abdominal pain that started earlier today.  Patient states that she found out 2 days ago that she was pregnant after doing a home pregnancy test but has had pain in her right groin today.  On exam, patient has mild suprapubic tenderness with no CVA tenderness.  Bowel sounds are normal in all quadrants and abdomen is soft.  Normal S1/S2 without clicks or murmurs.  No JVD.  Lungs clear to auscultation in all fields.  Pupils PERRLA.  GCS 15.  Initial differentials include ectopic pregnancy, acute UTI, musculoskeletal strain, acute appendicitis, PID.  This is not a complete list.    IV was established labs were obtained.  Patient was assessed by provider in triage, who placed initial orders.  Patient received above examination after she was placed in private ER treatment room by myself.  CBC without leukocytosis or anemia.  CMP with normal kidney function and LFTs.  Electrolytes are not imbalance.  Urine drug screen did not detect any substances.  Urinalysis with trace leukocytes and +1 bacteria but was not a sterile catch.  Urine culture was ordered but I do not believe patient needs treatment at this time.  hCG was noted to be 1173 which is within range of expected gestation.  My interpretation of ultrasound did not reveal evidence of pregnancy.  This is concurrent with radiologist interpretation, who notes normal appearance of uterus and ovaries.  Intrauterine gestation is not identified but could be reflective of early pregnancy that is too small to visualize but a nonvisualized ectopic pregnancy cannot be excluded.  Upon reassessment, these results were discussed the patient.  She was offered pelvic exam with wet prep swab to assess for bacterial vaginosis and other complications but patient has not had any vaginal discharge or discomfort at this time and does not wish to do further work-up.   She does have an outpatient ultrasound scheduled in less than a week, which I recommended her to follow-up with.  She also has an OB/GYN appointment scheduled for later this month.  Patient was advised to return to the ER if her pain increases or if she begins vomiting.  She verbalized understanding is agreeable to plan of care.  Patient has remained hemodynamically stable and is ready for discharge at this time.  She is able to ambulate upright steadily without assistance upon discharge.  No acute distress.    Patient is aware that discharge does not mean that nothing is wrong but it indicates no emergency is present and they must continue care with follow-up as given below or physician of their choice.    This document is intended for medical expert use only.  Reading of this document by patients and/or patient's family without participating medical staff guidance may result in misinterpretation and unintended morbidity.  Any interpretation of such data is the responsibility of the patient and/or family member responsible for the patient in concert with their primary or specialist providers, not to be left for sources of online search as such as [employer], [employer] or similar queries.  Relying on these approaches to knowledge may result in misinterpretation, misguided goals of care and even death should patient or family members try recommendations outside of the realm of professional medical care in a supervised inpatient environment.    This medical document was created using Dragon dictation system. Some errors in speech recognition may occur.    Final diagnoses:   Lower abdominal pain   Less than 8 weeks gestation of pregnancy       ED Disposition  ED Disposition       ED Disposition   Discharge    Condition   Stable    Comment   --               Brittany Oseguera PA  [address] [facility] [place] [address]  [phone]          [facility]  [address]  [address]  [phone]             Medication List      No changes were made to your prescriptions during this visit.            Lucy Burkett, APRN  08/23/23 0021

## 2023-08-23 NOTE — DISCHARGE INSTRUCTIONS
Continue monitoring your symptoms.  Take Tylenol as needed for pain and discomfort.  Alternate use of ice and heat for 20 minutes at a time several times a day.  Rest.  Avoid heavy lifting or straining.    Follow-up with OB/GYN for further evaluation and management.  Follow-up with primary care provider as needed.    Return to the ER for new or worsening symptoms.

## 2023-08-25 ENCOUNTER — CLINICAL SUPPORT (OUTPATIENT)
Dept: FAMILY MEDICINE CLINIC | Facility: CLINIC | Age: 28
End: 2023-08-25
Payer: COMMERCIAL

## 2023-08-25 DIAGNOSIS — Z3A.01 LESS THAN 8 WEEKS GESTATION OF PREGNANCY: Primary | ICD-10-CM

## 2023-08-25 PROCEDURE — 84702 CHORIONIC GONADOTROPIN TEST: CPT | Performed by: NURSE PRACTITIONER

## 2023-08-25 NOTE — PROGRESS NOTES
Venipuncture Blood Specimen Collection  Venipuncture performed in the left arm by Trini Horne MA with good hemostasis. Patient tolerated the procedure well without complications.   08/25/23   Trini Horne MA

## 2023-08-27 LAB — HCG INTACT+B SERPL-ACNC: 3098 MIU/ML

## 2023-08-28 RX ORDER — BUSPIRONE HYDROCHLORIDE 5 MG/1
5 TABLET ORAL 2 TIMES DAILY PRN
Qty: 60 TABLET | Refills: 2 | Status: SHIPPED | OUTPATIENT
Start: 2023-08-28

## 2023-08-31 LAB
EXTERNAL HEPATITIS B SURFACE ANTIGEN: NEGATIVE
EXTERNAL HEPATITIS C AB: REACTIVE
EXTERNAL HEPATITIS C, RNA QUANT PCR: NEGATIVE
EXTERNAL RUBELLA QUALITATIVE: NORMAL
EXTERNAL SYPHILIS RPR SCREEN: NORMAL
HIV1 P24 AG SERPL QL IA: NORMAL

## 2023-09-05 RX ORDER — LISINOPRIL 5 MG/1
TABLET ORAL
Qty: 30 TABLET | Refills: 5 | Status: SHIPPED | OUTPATIENT
Start: 2023-09-05 | End: 2023-09-11

## 2023-09-11 ENCOUNTER — OFFICE VISIT (OUTPATIENT)
Dept: FAMILY MEDICINE CLINIC | Facility: CLINIC | Age: 28
End: 2023-09-11
Payer: COMMERCIAL

## 2023-09-11 VITALS
DIASTOLIC BLOOD PRESSURE: 70 MMHG | WEIGHT: 223 LBS | BODY MASS INDEX: 35.84 KG/M2 | OXYGEN SATURATION: 99 % | HEIGHT: 66 IN | SYSTOLIC BLOOD PRESSURE: 122 MMHG | HEART RATE: 68 BPM

## 2023-09-11 DIAGNOSIS — I10 PRIMARY HYPERTENSION: ICD-10-CM

## 2023-09-11 DIAGNOSIS — H60.502 ACUTE OTITIS EXTERNA OF LEFT EAR, UNSPECIFIED TYPE: ICD-10-CM

## 2023-09-11 DIAGNOSIS — G43.809 OTHER MIGRAINE WITHOUT STATUS MIGRAINOSUS, NOT INTRACTABLE: ICD-10-CM

## 2023-09-11 DIAGNOSIS — F41.9 ANXIETY: Primary | ICD-10-CM

## 2023-09-11 PROCEDURE — 99214 OFFICE O/P EST MOD 30 MIN: CPT | Performed by: NURSE PRACTITIONER

## 2023-09-11 RX ORDER — LABETALOL 100 MG/1
100 TABLET, FILM COATED ORAL EVERY 12 HOURS SCHEDULED
COMMUNITY
Start: 2023-08-21

## 2023-09-11 RX ORDER — CIPROFLOXACIN AND DEXAMETHASONE 3; 1 MG/ML; MG/ML
4 SUSPENSION/ DROPS AURICULAR (OTIC) 2 TIMES DAILY
Qty: 7.5 ML | Refills: 0 | Status: SHIPPED | OUTPATIENT
Start: 2023-09-11 | End: 2023-09-18

## 2023-09-11 NOTE — PROGRESS NOTES
"Chief Complaint  Follow-up (3 month follow up BPD/migraines )    Subjective        Ning Gomez presents to [facility]  History of Present Illness    Patient presents for follow-up visit.    Patient has history of mixed BPD - previously had complaints of feeling emotionless, overwhelmed and having frequent mood swings, worse with Prozac.  Symptoms are stable on Lamictal 50 mg BID. Patient previously prescribed Buspar 5 mg BID PRN -has since stopped taking since became pregnant.  Patient does have some mild to moderate anxiety, managing with coping strategies.    Patient has a history of migraines, started on Elavil 25 mg nightly but has been discontinued due to current pregnancy. She is having one migraine weekly without medication.  Patient is taking Tylenol as needed.    Patient has hypertension, stable on labetalol 100 mg twice daily.  Medication was changed from lisinopril at start of pregnancy.    Patient is complaining of left ear pain with a popping sensation.  Symptoms started a few days ago.  Patient denies tinnitus, hearing loss, chest pain, fever or cough.    Objective   Vital Signs:  /70 (BP Location: Left arm, Patient Position: Sitting, Cuff Size: Adult)   Pulse 68   Ht 167.6 cm (66\")   Wt 101 kg (223 lb)   SpO2 99%   BMI 35.99 kg/m²   Estimated body mass index is 35.99 kg/m² as calculated from the following:    Height as of this encounter: 167.6 cm (66\").    Weight as of this encounter: 101 kg (223 lb).           Physical Exam  Constitutional:       Appearance: Normal appearance.   HENT:      Head: Normocephalic.   Cardiovascular:      Rate and Rhythm: Normal rate and regular rhythm.   Pulmonary:      Effort: Pulmonary effort is normal.      Breath sounds: Normal breath sounds.   Abdominal:      General: Abdomen is flat. Bowel sounds are normal.      Palpations: Abdomen is soft.   Musculoskeletal:         General: Normal range of motion.      Cervical back: " Neck supple.      Right lower leg: No edema.      Left lower leg: No edema.   Skin:     General: Skin is warm and dry.   Neurological:      Mental Status: She is alert and oriented to person, place, and time.      Gait: Gait is intact.   Psychiatric:         Attention and Perception: Attention normal.         Mood and Affect: Mood normal.         Speech: Speech normal.      Result Review :    CMP          2/27/2023    09:04 8/22/2023    19:41   CMP   Glucose 94  95    BUN 14  14    Creatinine 0.63  0.61    EGFR 124.1  125.1    Sodium 140  140    Potassium 4.6  3.5    Chloride 103  102    Calcium 9.2  9.6    Total Protein 7.2  7.1    Albumin 4.5  4.6    Globulin 2.7  2.5    Total Bilirubin 0.5  0.4    Alkaline Phosphatase 49  48    AST (SGOT) 14  20    ALT (SGPT) 9  18    Albumin/Globulin Ratio 1.7  1.8    BUN/Creatinine Ratio 22.2  23.0    Anion Gap 10.1  12.0      CBC          2/27/2023    09:04 8/22/2023    19:41   CBC   WBC 6.45  8.60    RBC 4.41  4.10    Hemoglobin 13.0  12.6    Hematocrit 39.9  37.2    MCV 90.5  90.9    MCH 29.5  30.7    MCHC 32.6  33.8    RDW 11.8  13.0    Platelets 349  347      Lipid Panel          2/27/2023    09:04   Lipid Panel   Total Cholesterol 174    Triglycerides 58    HDL Cholesterol 49    VLDL Cholesterol 11    LDL Cholesterol  114    LDL/HDL Ratio 2.31      TSH          2/27/2023    09:04   TSH   TSH 1.490                   Assessment and Plan   Diagnoses and all orders for this visit:    1. Anxiety (Primary)  Assessment & Plan:  Stable without BuSpar at this time, call with new or worsening concerns      2. Other migraine without status migrainosus, not intractable  Assessment & Plan:  Tylenol as needed  Call with new or worsening concerns        3. Primary hypertension  Assessment & Plan:  Blood pressure goal less than 140/90  Continue labetalol as prescribed      4. Acute otitis externa of left ear, unspecified type  Assessment & Plan:  Ciprodex x7 days  Recommended daily use  of Flonase      Other orders  -     ciprofloxacin-dexAMETHasone (Ciprodex) 0.3-0.1 % otic suspension; Administer 4 drops into the left ear 2 (Two) Times a Day for 7 days.  Dispense: 7.5 mL; Refill: 0           I spent 30 minutes caring for Ning on this date of service. This time includes time spent by me in the following activities:preparing for the visit, reviewing tests, obtaining and/or reviewing a separately obtained history, performing a medically appropriate examination and/or evaluation , counseling and educating the patient/family/caregiver, ordering medications, tests, or procedures, documenting information in the medical record, and care coordination  Follow Up   Return in about 6 months (around 3/11/2024) for HTN/BPD .  Patient was given instructions and counseling regarding her condition or for health maintenance advice. Please see specific information pulled into the AVS if appropriate.

## 2023-09-18 RX ORDER — LAMOTRIGINE 25 MG/1
TABLET ORAL
Qty: 60 TABLET | Refills: 1 | Status: SHIPPED | OUTPATIENT
Start: 2023-09-18

## 2023-09-29 RX ORDER — LAMOTRIGINE 25 MG/1
TABLET ORAL
Qty: 60 TABLET | Refills: 1 | OUTPATIENT
Start: 2023-09-29

## 2023-10-08 RX ORDER — SCOLOPAMINE TRANSDERMAL SYSTEM 1 MG/1
1 PATCH, EXTENDED RELEASE TRANSDERMAL
Qty: 4 PATCH | Refills: 0 | Status: SHIPPED | OUTPATIENT
Start: 2023-10-08 | End: 2023-10-18

## 2023-10-20 RX ORDER — LAMOTRIGINE 25 MG/1
50 TABLET ORAL 2 TIMES DAILY
Qty: 360 TABLET | Refills: 1 | Status: SHIPPED | OUTPATIENT
Start: 2023-10-20

## 2024-02-06 ENCOUNTER — TELEPHONE (OUTPATIENT)
Dept: FAMILY MEDICINE CLINIC | Facility: CLINIC | Age: 29
End: 2024-02-06
Payer: COMMERCIAL

## 2024-02-06 NOTE — TELEPHONE ENCOUNTER
Caller: Ning Gomez    Relationship: Self    Best call back number:    [phone] (Mobile)       What medication are you requesting: PATIENT WANTS TO STOP lamoTRIgine (LaMICtal) 25 MG tablet FOR THE REMAINDER OF HER PREGNANCY        Additional notes: PLEASE CONTACT PATIENT TO ADVISE.        THANKS

## 2024-02-06 NOTE — TELEPHONE ENCOUNTER
Is this a notification or does patient have a question?  If she wants to stop Lamictal for the remainder of her pregnancy, I would recommend taking 1/2 tablet for 1 week and then 1/2 tablet every other day for 5 doses before stopping

## 2024-03-27 LAB — EXTERNAL GROUP B STREP ANTIGEN: NEGATIVE

## 2024-04-03 RX ORDER — LABETALOL 100 MG/1
100 TABLET, FILM COATED ORAL EVERY 12 HOURS SCHEDULED
Qty: 60 TABLET | Refills: 1 | Status: SHIPPED | OUTPATIENT
Start: 2024-04-03

## 2024-04-03 NOTE — TELEPHONE ENCOUNTER
Caller: Jason Ning Kelli    Relationship: Self    Best call back number: [phone]    Requested Prescriptions:   Requested Prescriptions     Pending Prescriptions Disp Refills    labetalol (NORMODYNE) 100 MG tablet       Sig: Take 1 tablet by mouth Every 12 (Twelve) Hours.        Pharmacy where request should be sent: [facility]Y #[id number] [place], [place] - [address] [facility] [address] - [phone]  - [phone] FX     Last office visit with prescribing clinician: 9/11/2023   Last telemedicine visit with prescribing clinician: Visit date not found   Next office visit with prescribing clinician: Visit date not found     Additional details provided by patient: PATIENT STATED SHE IS COMPLETELY OUT OF THIS.    Does the patient have less than a 3 day supply:  [x] Yes  [] No    Would you like a call back once the refill request has been completed: [x] Yes [] No    If the office needs to give you a call back, can they leave a voicemail: [x] Yes [] No    Roselia Rivas Rep   04/03/24 13:27 EDT

## 2024-04-18 ENCOUNTER — PREP FOR SURGERY (OUTPATIENT)
Dept: OTHER | Facility: HOSPITAL | Age: 29
End: 2024-04-18
Payer: COMMERCIAL

## 2024-04-18 RX ORDER — LIDOCAINE HYDROCHLORIDE 10 MG/ML
0.5 INJECTION, SOLUTION EPIDURAL; INFILTRATION; INTRACAUDAL; PERINEURAL ONCE AS NEEDED
Status: CANCELLED | OUTPATIENT
Start: 2024-04-18

## 2024-04-18 RX ORDER — FAMOTIDINE 20 MG/1
20 TABLET, FILM COATED ORAL ONCE AS NEEDED
Status: CANCELLED | OUTPATIENT
Start: 2024-04-18

## 2024-04-18 RX ORDER — SODIUM CHLORIDE 0.9 % (FLUSH) 0.9 %
10 SYRINGE (ML) INJECTION AS NEEDED
Status: CANCELLED | OUTPATIENT
Start: 2024-04-18

## 2024-04-18 RX ORDER — CARBOPROST TROMETHAMINE 250 UG/ML
250 INJECTION, SOLUTION INTRAMUSCULAR AS NEEDED
Status: CANCELLED | OUTPATIENT
Start: 2024-04-18

## 2024-04-18 RX ORDER — ONDANSETRON 2 MG/ML
4 INJECTION INTRAMUSCULAR; INTRAVENOUS EVERY 6 HOURS PRN
Status: CANCELLED | OUTPATIENT
Start: 2024-04-18

## 2024-04-18 RX ORDER — OXYTOCIN-SODIUM CHLORIDE 0.9% IV SOLN 30 UNIT/500ML 30-0.9/5 UT/ML-%
2 SOLUTION INTRAVENOUS
Status: CANCELLED | OUTPATIENT
Start: 2024-04-19

## 2024-04-18 RX ORDER — OXYTOCIN-SODIUM CHLORIDE 0.9% IV SOLN 30 UNIT/500ML 30-0.9/5 UT/ML-%
250 SOLUTION INTRAVENOUS CONTINUOUS
Status: CANCELLED | OUTPATIENT
Start: 2024-04-18 | End: 2024-04-18

## 2024-04-18 RX ORDER — ONDANSETRON 4 MG/1
4 TABLET, ORALLY DISINTEGRATING ORAL EVERY 6 HOURS PRN
Status: CANCELLED | OUTPATIENT
Start: 2024-04-18

## 2024-04-18 RX ORDER — OXYTOCIN-SODIUM CHLORIDE 0.9% IV SOLN 30 UNIT/500ML 30-0.9/5 UT/ML-%
2 SOLUTION INTRAVENOUS ONCE
Status: CANCELLED | OUTPATIENT
Start: 2024-04-18 | End: 2024-04-18

## 2024-04-18 RX ORDER — IBUPROFEN 600 MG/1
600 TABLET ORAL EVERY 6 HOURS PRN
Status: CANCELLED | OUTPATIENT
Start: 2024-04-18

## 2024-04-18 RX ORDER — MISOPROSTOL 200 UG/1
800 TABLET ORAL AS NEEDED
Status: CANCELLED | OUTPATIENT
Start: 2024-04-18

## 2024-04-18 RX ORDER — METHYLERGONOVINE MALEATE 0.2 MG/ML
200 INJECTION INTRAVENOUS ONCE AS NEEDED
Status: CANCELLED | OUTPATIENT
Start: 2024-04-18

## 2024-04-18 RX ORDER — SODIUM CHLORIDE 9 MG/ML
40 INJECTION, SOLUTION INTRAVENOUS AS NEEDED
Status: CANCELLED | OUTPATIENT
Start: 2024-04-18

## 2024-04-18 RX ORDER — ACETAMINOPHEN 325 MG/1
650 TABLET ORAL EVERY 4 HOURS PRN
Status: CANCELLED | OUTPATIENT
Start: 2024-04-18

## 2024-04-18 RX ORDER — SODIUM CHLORIDE, SODIUM LACTATE, POTASSIUM CHLORIDE, CALCIUM CHLORIDE 600; 310; 30; 20 MG/100ML; MG/100ML; MG/100ML; MG/100ML
125 INJECTION, SOLUTION INTRAVENOUS CONTINUOUS
Status: CANCELLED | OUTPATIENT
Start: 2024-04-18

## 2024-04-18 RX ORDER — FAMOTIDINE 10 MG/ML
20 INJECTION, SOLUTION INTRAVENOUS ONCE AS NEEDED
Status: CANCELLED | OUTPATIENT
Start: 2024-04-18

## 2024-04-18 RX ORDER — SODIUM CHLORIDE 0.9 % (FLUSH) 0.9 %
10 SYRINGE (ML) INJECTION EVERY 12 HOURS SCHEDULED
Status: CANCELLED | OUTPATIENT
Start: 2024-04-18

## 2024-04-19 ENCOUNTER — HOSPITAL ENCOUNTER (OUTPATIENT)
Dept: LABOR AND DELIVERY | Facility: HOSPITAL | Age: 29
Discharge: HOME | End: 2024-04-19
Payer: COMMERCIAL

## 2024-04-19 ENCOUNTER — ANESTHESIA (OUTPATIENT)
Dept: LABOR AND DELIVERY | Facility: HOSPITAL | Age: 29
End: 2024-04-19
Payer: COMMERCIAL

## 2024-04-19 ENCOUNTER — ANESTHESIA EVENT (OUTPATIENT)
Dept: LABOR AND DELIVERY | Facility: HOSPITAL | Age: 29
End: 2024-04-19
Payer: COMMERCIAL

## 2024-04-19 ENCOUNTER — HOSPITAL ENCOUNTER (INPATIENT)
Facility: HOSPITAL | Age: 29
LOS: 2 days | Discharge: HOME | End: 2024-04-21
Attending: STUDENT IN AN ORGANIZED HEALTH CARE EDUCATION/TRAINING PROGRAM | Admitting: STUDENT IN AN ORGANIZED HEALTH CARE EDUCATION/TRAINING PROGRAM
Payer: COMMERCIAL

## 2024-04-19 PROBLEM — Z34.90 ENCOUNTER FOR INDUCTION OF LABOR: Status: ACTIVE | Noted: 2024-04-19

## 2024-04-19 PROBLEM — B19.20 HEPATITIS C INFECTION: Status: ACTIVE | Noted: 2024-04-19

## 2024-04-19 PROBLEM — H60.502 ACUTE OTITIS EXTERNA OF LEFT EAR: Status: RESOLVED | Noted: 2023-09-11 | Resolved: 2024-04-19

## 2024-04-19 PROBLEM — R19.7 DIARRHEA: Status: RESOLVED | Noted: 2021-12-02 | Resolved: 2024-04-19

## 2024-04-19 PROBLEM — Z00.00 PREVENTATIVE HEALTH CARE: Status: RESOLVED | Noted: 2021-12-02 | Resolved: 2024-04-19

## 2024-04-19 PROBLEM — R63.5 WEIGHT GAIN: Status: RESOLVED | Noted: 2023-02-27 | Resolved: 2024-04-19

## 2024-04-19 PROBLEM — O34.219 VBAC (VAGINAL BIRTH AFTER CESAREAN): Status: ACTIVE | Noted: 2024-04-19

## 2024-04-19 PROBLEM — L02.91 ABSCESS OF SKIN: Status: RESOLVED | Noted: 2021-11-01 | Resolved: 2024-04-19

## 2024-04-19 LAB
ABO GROUP BLD: NORMAL
ALBUMIN SERPL-MCNC: 3.4 G/DL (ref 3.5–5.2)
ALBUMIN/GLOB SERPL: 1.1 G/DL
ALP SERPL-CCNC: 112 U/L (ref 39–117)
ALT SERPL W P-5'-P-CCNC: 18 U/L (ref 1–33)
ANION GAP SERPL CALCULATED.3IONS-SCNC: 14 MMOL/L (ref 5–15)
AST SERPL-CCNC: 18 U/L (ref 1–32)
BASOPHILS # BLD AUTO: 0.03 10*3/MM3 (ref 0–0.2)
BASOPHILS NFR BLD AUTO: 0.3 % (ref 0–1.5)
BILIRUB SERPL-MCNC: 0.2 MG/DL (ref 0–1.2)
BLD GP AB SCN SERPL QL: NEGATIVE
BUN SERPL-MCNC: 10 MG/DL (ref 6–20)
BUN/CREAT SERPL: 16.1 (ref 7–25)
CALCIUM SPEC-SCNC: 9.4 MG/DL (ref 8.6–10.5)
CHLORIDE SERPL-SCNC: 102 MMOL/L (ref 98–107)
CO2 SERPL-SCNC: 21 MMOL/L (ref 22–29)
CREAT SERPL-MCNC: 0.62 MG/DL (ref 0.57–1)
DEPRECATED RDW RBC AUTO: 43.7 FL (ref 37–54)
EGFRCR SERPLBLD CKD-EPI 2021: 123.8 ML/MIN/1.73
EOSINOPHIL # BLD AUTO: 0.11 10*3/MM3 (ref 0–0.4)
EOSINOPHIL NFR BLD AUTO: 1.2 % (ref 0.3–6.2)
ERYTHROCYTE [DISTWIDTH] IN BLOOD BY AUTOMATED COUNT: 14.1 % (ref 12.3–15.4)
GLOBULIN UR ELPH-MCNC: 3.2 GM/DL
GLUCOSE SERPL-MCNC: 106 MG/DL (ref 65–99)
HCT VFR BLD AUTO: 34.4 % (ref 34–46.6)
HGB BLD-MCNC: 10.8 G/DL (ref 12–15.9)
HOLD SPECIMEN: NORMAL
IMM GRANULOCYTES # BLD AUTO: 0.05 10*3/MM3 (ref 0–0.05)
IMM GRANULOCYTES NFR BLD AUTO: 0.6 % (ref 0–0.5)
LYMPHOCYTES # BLD AUTO: 2.14 10*3/MM3 (ref 0.7–3.1)
LYMPHOCYTES NFR BLD AUTO: 24 % (ref 19.6–45.3)
MCH RBC QN AUTO: 26.5 PG (ref 26.6–33)
MCHC RBC AUTO-ENTMCNC: 31.4 G/DL (ref 31.5–35.7)
MCV RBC AUTO: 84.3 FL (ref 79–97)
MONOCYTES # BLD AUTO: 0.6 10*3/MM3 (ref 0.1–0.9)
MONOCYTES NFR BLD AUTO: 6.7 % (ref 5–12)
NEUTROPHILS NFR BLD AUTO: 5.99 10*3/MM3 (ref 1.7–7)
NEUTROPHILS NFR BLD AUTO: 67.2 % (ref 42.7–76)
NRBC BLD AUTO-RTO: 0 /100 WBC (ref 0–0.2)
PLATELET # BLD AUTO: 288 10*3/MM3 (ref 140–450)
PMV BLD AUTO: 10.7 FL (ref 6–12)
POTASSIUM SERPL-SCNC: 4 MMOL/L (ref 3.5–5.2)
PROT SERPL-MCNC: 6.6 G/DL (ref 6–8.5)
RBC # BLD AUTO: 4.08 10*6/MM3 (ref 3.77–5.28)
RH BLD: POSITIVE
SODIUM SERPL-SCNC: 137 MMOL/L (ref 136–145)
T PALLIDUM IGG SER QL: NORMAL
T&S EXPIRATION DATE: NORMAL
WBC NRBC COR # BLD AUTO: 8.92 10*3/MM3 (ref 3.4–10.8)

## 2024-04-19 PROCEDURE — 25010000002 MORPHINE PER 10 MG: Performed by: STUDENT IN AN ORGANIZED HEALTH CARE EDUCATION/TRAINING PROGRAM

## 2024-04-19 PROCEDURE — 86901 BLOOD TYPING SEROLOGIC RH(D): CPT | Performed by: STUDENT IN AN ORGANIZED HEALTH CARE EDUCATION/TRAINING PROGRAM

## 2024-04-19 PROCEDURE — 80053 COMPREHEN METABOLIC PANEL: CPT | Performed by: STUDENT IN AN ORGANIZED HEALTH CARE EDUCATION/TRAINING PROGRAM

## 2024-04-19 PROCEDURE — 25810000003 LACTATED RINGERS PER 1000 ML: Performed by: STUDENT IN AN ORGANIZED HEALTH CARE EDUCATION/TRAINING PROGRAM

## 2024-04-19 PROCEDURE — C1755 CATHETER, INTRASPINAL: HCPCS | Performed by: ANESTHESIOLOGY

## 2024-04-19 PROCEDURE — 86850 RBC ANTIBODY SCREEN: CPT | Performed by: STUDENT IN AN ORGANIZED HEALTH CARE EDUCATION/TRAINING PROGRAM

## 2024-04-19 PROCEDURE — 86780 TREPONEMA PALLIDUM: CPT | Performed by: STUDENT IN AN ORGANIZED HEALTH CARE EDUCATION/TRAINING PROGRAM

## 2024-04-19 PROCEDURE — 88307 TISSUE EXAM BY PATHOLOGIST: CPT | Performed by: STUDENT IN AN ORGANIZED HEALTH CARE EDUCATION/TRAINING PROGRAM

## 2024-04-19 PROCEDURE — 85025 COMPLETE CBC W/AUTO DIFF WBC: CPT | Performed by: STUDENT IN AN ORGANIZED HEALTH CARE EDUCATION/TRAINING PROGRAM

## 2024-04-19 PROCEDURE — 86900 BLOOD TYPING SEROLOGIC ABO: CPT | Performed by: STUDENT IN AN ORGANIZED HEALTH CARE EDUCATION/TRAINING PROGRAM

## 2024-04-19 RX ORDER — ONDANSETRON 4 MG/1
4 TABLET, ORALLY DISINTEGRATING ORAL EVERY 6 HOURS PRN
Status: DISCONTINUED | OUTPATIENT
Start: 2024-04-19 | End: 2024-04-19

## 2024-04-19 RX ORDER — METHYLERGONOVINE MALEATE 0.2 MG/ML
200 INJECTION INTRAVENOUS ONCE AS NEEDED
Status: DISCONTINUED | OUTPATIENT
Start: 2024-04-19 | End: 2024-04-19 | Stop reason: HOSPADM

## 2024-04-19 RX ORDER — FAMOTIDINE 10 MG/ML
20 INJECTION, SOLUTION INTRAVENOUS EVERY 12 HOURS PRN
Status: DISCONTINUED | OUTPATIENT
Start: 2024-04-19 | End: 2024-04-19

## 2024-04-19 RX ORDER — OXYTOCIN/0.9 % SODIUM CHLORIDE 30/500 ML
2 PLASTIC BAG, INJECTION (ML) INTRAVENOUS ONCE
Status: DISCONTINUED | OUTPATIENT
Start: 2024-04-19 | End: 2024-04-19 | Stop reason: HOSPADM

## 2024-04-19 RX ORDER — OXYTOCIN/0.9 % SODIUM CHLORIDE 30/500 ML
2 PLASTIC BAG, INJECTION (ML) INTRAVENOUS
Status: DISCONTINUED | OUTPATIENT
Start: 2024-04-19 | End: 2024-04-19

## 2024-04-19 RX ORDER — ACETAMINOPHEN 325 MG/1
650 TABLET ORAL EVERY 4 HOURS PRN
Status: DISCONTINUED | OUTPATIENT
Start: 2024-04-19 | End: 2024-04-19 | Stop reason: HOSPADM

## 2024-04-19 RX ORDER — OXYTOCIN/0.9 % SODIUM CHLORIDE 30/500 ML
250 PLASTIC BAG, INJECTION (ML) INTRAVENOUS CONTINUOUS
Status: ACTIVE | OUTPATIENT
Start: 2024-04-19 | End: 2024-04-19

## 2024-04-19 RX ORDER — LIDOCAINE HYDROCHLORIDE 10 MG/ML
0.5 INJECTION, SOLUTION EPIDURAL; INFILTRATION; INTRACAUDAL; PERINEURAL ONCE AS NEEDED
Status: DISCONTINUED | OUTPATIENT
Start: 2024-04-19 | End: 2024-04-19

## 2024-04-19 RX ORDER — SODIUM CHLORIDE 0.9 % (FLUSH) 0.9 %
1-10 SYRINGE (ML) INJECTION AS NEEDED
Status: DISCONTINUED | OUTPATIENT
Start: 2024-04-19 | End: 2024-04-21 | Stop reason: HOSPADM

## 2024-04-19 RX ORDER — LIDOCAINE HCL/EPINEPHRINE/PF 2%-1:200K
VIAL (ML) INJECTION AS NEEDED
Status: DISCONTINUED | OUTPATIENT
Start: 2024-04-19 | End: 2024-04-19 | Stop reason: SURG

## 2024-04-19 RX ORDER — SODIUM CHLORIDE, SODIUM LACTATE, POTASSIUM CHLORIDE, CALCIUM CHLORIDE 600; 310; 30; 20 MG/100ML; MG/100ML; MG/100ML; MG/100ML
125 INJECTION, SOLUTION INTRAVENOUS CONTINUOUS
Status: DISCONTINUED | OUTPATIENT
Start: 2024-04-19 | End: 2024-04-19

## 2024-04-19 RX ORDER — LIDOCAINE HCL/EPINEPHRINE/PF 2%-1:200K
VIAL (ML) INJECTION
Status: COMPLETED
Start: 2024-04-19 | End: 2024-04-19

## 2024-04-19 RX ORDER — PRENATAL VIT NO.126/IRON/FOLIC 28MG-0.8MG
1 TABLET ORAL DAILY
COMMUNITY

## 2024-04-19 RX ORDER — EPHEDRINE SULFATE 5 MG/ML
10 INJECTION INTRAVENOUS
Status: DISCONTINUED | OUTPATIENT
Start: 2024-04-19 | End: 2024-04-19

## 2024-04-19 RX ORDER — FAMOTIDINE 20 MG/1
20 TABLET, FILM COATED ORAL ONCE AS NEEDED
Status: DISCONTINUED | OUTPATIENT
Start: 2024-04-19 | End: 2024-04-19

## 2024-04-19 RX ORDER — ONDANSETRON 2 MG/ML
4 INJECTION INTRAMUSCULAR; INTRAVENOUS EVERY 6 HOURS PRN
Status: DISCONTINUED | OUTPATIENT
Start: 2024-04-19 | End: 2024-04-19

## 2024-04-19 RX ORDER — LAMOTRIGINE 25 MG/1
25 TABLET ORAL EVERY 12 HOURS SCHEDULED
Status: DISCONTINUED | OUTPATIENT
Start: 2024-04-19 | End: 2024-04-20

## 2024-04-19 RX ORDER — MISOPROSTOL 200 UG/1
800 TABLET ORAL AS NEEDED
Status: DISCONTINUED | OUTPATIENT
Start: 2024-04-19 | End: 2024-04-19 | Stop reason: HOSPADM

## 2024-04-19 RX ORDER — CARBOPROST TROMETHAMINE 250 UG/ML
250 INJECTION, SOLUTION INTRAMUSCULAR AS NEEDED
Status: DISCONTINUED | OUTPATIENT
Start: 2024-04-19 | End: 2024-04-19 | Stop reason: HOSPADM

## 2024-04-19 RX ORDER — IBUPROFEN 600 MG/1
600 TABLET ORAL EVERY 6 HOURS PRN
Status: DISCONTINUED | OUTPATIENT
Start: 2024-04-19 | End: 2024-04-19 | Stop reason: HOSPADM

## 2024-04-19 RX ORDER — SODIUM CHLORIDE 0.9 % (FLUSH) 0.9 %
10 SYRINGE (ML) INJECTION AS NEEDED
Status: DISCONTINUED | OUTPATIENT
Start: 2024-04-19 | End: 2024-04-19

## 2024-04-19 RX ORDER — SODIUM CHLORIDE 0.9 % (FLUSH) 0.9 %
10 SYRINGE (ML) INJECTION EVERY 12 HOURS SCHEDULED
Status: DISCONTINUED | OUTPATIENT
Start: 2024-04-19 | End: 2024-04-19

## 2024-04-19 RX ORDER — FENTANYL/BUPIVACAINE/NS/PF 2-1250MCG
PLASTIC BAG, INJECTION (ML) INJECTION
Status: COMPLETED
Start: 2024-04-19 | End: 2024-04-19

## 2024-04-19 RX ORDER — FAMOTIDINE 10 MG/ML
20 INJECTION, SOLUTION INTRAVENOUS ONCE AS NEEDED
Status: DISCONTINUED | OUTPATIENT
Start: 2024-04-19 | End: 2024-04-19

## 2024-04-19 RX ORDER — FAMOTIDINE 20 MG/1
20 TABLET, FILM COATED ORAL EVERY 12 HOURS PRN
Status: DISCONTINUED | OUTPATIENT
Start: 2024-04-19 | End: 2024-04-19

## 2024-04-19 RX ORDER — OXYTOCIN/0.9 % SODIUM CHLORIDE 30/500 ML
125 PLASTIC BAG, INJECTION (ML) INTRAVENOUS ONCE AS NEEDED
Status: COMPLETED | OUTPATIENT
Start: 2024-04-19 | End: 2024-04-19

## 2024-04-19 RX ORDER — LABETALOL 100 MG/1
100 TABLET, FILM COATED ORAL EVERY 12 HOURS SCHEDULED
Status: DISCONTINUED | OUTPATIENT
Start: 2024-04-19 | End: 2024-04-20

## 2024-04-19 RX ORDER — FENTANYL/BUPIVACAINE/NS/PF 2-1250MCG
PLASTIC BAG, INJECTION (ML) INJECTION CONTINUOUS
Status: DISCONTINUED | OUTPATIENT
Start: 2024-04-19 | End: 2024-04-19

## 2024-04-19 RX ORDER — FENTANYL/BUPIVACAINE/NS/PF 2-1250MCG
PLASTIC BAG, INJECTION (ML) INJECTION CONTINUOUS PRN
Status: DISCONTINUED | OUTPATIENT
Start: 2024-04-19 | End: 2024-04-19 | Stop reason: SURG

## 2024-04-19 RX ORDER — VALACYCLOVIR HYDROCHLORIDE 500 MG/1
500 TABLET, FILM COATED ORAL EVERY 12 HOURS SCHEDULED
Status: DISCONTINUED | OUTPATIENT
Start: 2024-04-19 | End: 2024-04-20

## 2024-04-19 RX ORDER — SODIUM CHLORIDE 9 MG/ML
40 INJECTION, SOLUTION INTRAVENOUS AS NEEDED
Status: DISCONTINUED | OUTPATIENT
Start: 2024-04-19 | End: 2024-04-19

## 2024-04-19 RX ADMIN — SODIUM CHLORIDE, POTASSIUM CHLORIDE, SODIUM LACTATE AND CALCIUM CHLORIDE 125 ML/HR: 600; 310; 30; 20 INJECTION, SOLUTION INTRAVENOUS at 15:06

## 2024-04-19 RX ADMIN — SODIUM CHLORIDE, POTASSIUM CHLORIDE, SODIUM LACTATE AND CALCIUM CHLORIDE 125 ML/HR: 600; 310; 30; 20 INJECTION, SOLUTION INTRAVENOUS at 20:58

## 2024-04-19 RX ADMIN — Medication 10 ML/HR: at 14:55

## 2024-04-19 RX ADMIN — Medication 125 ML/HR: at 22:43

## 2024-04-19 RX ADMIN — ACETAMINOPHEN 650 MG: 325 TABLET, FILM COATED ORAL at 20:27

## 2024-04-19 RX ADMIN — LIDOCAINE HYDROCHLORIDE AND EPINEPHRINE 3 ML: 20; 5 INJECTION, SOLUTION EPIDURAL; INFILTRATION; INTRACAUDAL; PERINEURAL at 14:45

## 2024-04-19 RX ADMIN — Medication 200 MCG: at 20:55

## 2024-04-19 RX ADMIN — MORPHINE SULFATE 4 MG: 4 INJECTION, SOLUTION INTRAMUSCULAR; INTRAVENOUS at 11:51

## 2024-04-19 RX ADMIN — IBUPROFEN 600 MG: 600 TABLET, FILM COATED ORAL at 23:23

## 2024-04-19 RX ADMIN — SODIUM CHLORIDE, POTASSIUM CHLORIDE, SODIUM LACTATE AND CALCIUM CHLORIDE 125 ML/HR: 600; 310; 30; 20 INJECTION, SOLUTION INTRAVENOUS at 13:42

## 2024-04-19 RX ADMIN — FAMOTIDINE 20 MG: 10 INJECTION INTRAVENOUS at 20:27

## 2024-04-19 RX ADMIN — SODIUM CHLORIDE, POTASSIUM CHLORIDE, SODIUM LACTATE AND CALCIUM CHLORIDE 125 ML/HR: 600; 310; 30; 20 INJECTION, SOLUTION INTRAVENOUS at 07:31

## 2024-04-19 RX ADMIN — Medication 1 MILLI-UNITS/MIN: at 07:43

## 2024-04-19 RX ADMIN — VALACYCLOVIR HYDROCHLORIDE 500 MG: 500 TABLET, FILM COATED ORAL at 20:27

## 2024-04-19 NOTE — PROGRESS NOTES
"JOAN Silver  Obstetric Progress Note    Subjective   Patient resting in bed. Pain controlled with epidural in place, she has no concerns.   Throughout the day, having some difficulty with fetal tracing even with movement. Counseled patient on internal monitors for both FHR and contractions due to difficulty tracing contractions.     Objective     Vitals:  Vitals:    24 1647 24 1651 24 1656 24 1701   BP: 94/47   99/56   BP Location:       Patient Position:       Pulse: 73 81 78 81   Resp:       Temp:       TempSrc:       SpO2:  98% 98% 99%   Weight:       Height:         Flowsheet Rows      Flowsheet Row First Filed Value   Admission Height 167.6 cm (66\") Documented at 2024 0518   Admission Weight 113 kg (248 lb 0.3 oz) Documented at 2024 0518            Intake/Output Summary (Last 24 hours) at 2024 1743  Last data filed at 2024 1506  Gross per 24 hour   Intake 940 ml   Output 700 ml   Net 240 ml       Fetal Heart Rate Assessment:   Cat I-II, intermittent variable and early deceleration   Kipp:  Every 3-4 mins    Physical Exam:  General: Patient is in no acute distress    Pelvic Exam: /            Assessment & Plan     Principal Problem:    Encounter for induction of labor         Assessment:  1. Pitocin induction.  Amniotomy performed with clear fluid, titrate pitocin as tolerated.   Patient has received extensive counseling on risk of induction with prior CD.  Counseled on 1% risk of uterine ruture. Discussed  maternal and fetal risk of uterine rupture including: bleeding, death, neurological deficits, and disability. Patient also counseled on risk of emergency  section with prior pfannesteil incision and uterine scar, puts her at high risk for abdominal wall adhesions and adhesions to surrounding structures like the bowel or bladder. Therefore an emergent CD could lead to more risks compared to a controlled  section. Patient also counseled that she " can opt out of an TOLAC and ask for RCD to performed.   1720: Cat I-II, currently on pitocin, counseled patient on difficulty tracing FHR and CTX at times and placing internals, patient has high risk labor with TOLAC so shared decision led to placement of both with known negative viral load, vaginal delivery anticipated.     2. Plan of care has been reviewed with patient.  3.  Risks, benefits of treatment plan have been discussed.  4.  All questions have been answered.  5. CHTN on labetalol therapy- no s/sx of MEGHANA, order placed  6. Hep C with negative viral load, AST/ALT wnl   7. HSV continue PPX antiviral therapy, no evidence of lesions on exam, reports taking medication at home, order placed for this PM        Triny Underwood MD  4/19/2024  17:43 EDT

## 2024-04-19 NOTE — ANESTHESIA PREPROCEDURE EVALUATION
Anesthesia Evaluation     Patient summary reviewed and Nursing notes reviewed   NPO Solid Status: > 8 hours  NPO Liquid Status: < 2 hours           Airway   Mallampati: II  TM distance: >3 FB  Neck ROM: full  No difficulty expected  Dental - normal exam     Pulmonary - negative pulmonary ROS and normal exam   Cardiovascular - normal exam    (+) hypertension      Neuro/Psych  (+) headaches, psychiatric history  GI/Hepatic/Renal/Endo    (+) obesity, morbid obesity, hepatitis, liver disease    Musculoskeletal (-) negative ROS    Abdominal    Substance History      OB/GYN    (+) Pregnant        Other        ROS/Med Hx Other: 04/19/24 05:55  WBC: 8.92  RBC: 4.08  Hemoglobin: 10.8 (L)  Hematocrit: 34.4  Platelets: 288  RDW: 14.1  MCV: 84.3  MCH: 26.5 (L)  MCHC: 31.4 (L)  MPV: 10.7  RDW-SD: 43.7                  Anesthesia Plan    ASA 2     epidural       Anesthetic plan, risks, benefits, and alternatives have been provided, discussed and informed consent has been obtained with: patient.  Pre-procedure education provided  Use of blood products discussed with patient  Consented to blood products.    Plan discussed with CRNA and attending.    CODE STATUS:    Level Of Support Discussed With: Patient  Code Status (Patient has no pulse and is not breathing): CPR (Attempt to Resuscitate)  Medical Interventions (Patient has pulse or is breathing): Full Support

## 2024-04-19 NOTE — H&P
JOAN Silver  Obstetric History and Physical     Chief Complaint: patient presents for IOL, desires trial of labor with CD x 1    Subjective     Patient is a 28 y.o. female  currently at 39+4 , who presents for scheduled IOL desires TOLAC with prior CD x 1 due to fetal macrosomia and 9#3 infant.    Her prenatal care is c.b CD x 1, CHTN on labetalol, and genital HSV on antiviral therapy, tobacco use, depression, Hep C reactive with istory of drug use but she has a negative viral load.  Her previous obstetric/gynecological history is noted for Cdx 1, history of ICP, ectopic pregnancy s/p MTX therapy.      Prenatal Information:  See office H&P for full details and labs.      Past OB History:       OB History    Para Term  AB Living   3 1 1 0 1 1   SAB IAB Ectopic Molar Multiple Live Births   0 0 1 0 0 1               Past Medical History: Past Medical History:   Diagnosis Date    Anxiety 2017    Headache 2018    Hepatitis C     Herpes     Hidradenitis     Hydradenitis     Hypertension 2020    Obesity         Past Surgical History Past Surgical History:   Procedure Laterality Date     SECTION      CYST REMOVAL Right     wrist    DENTAL PROCEDURE      GANGLION CYST EXCISION  2014    WISDOM TOOTH EXTRACTION          Family History: Family History   Problem Relation Age of Onset    Hypertension Father     Heart disease Maternal Grandfather     Diabetes Maternal Grandfather     Hypertension Paternal Grandmother     Breast cancer Other       Social History:  reports that she has been smoking electronic cigarette. She has never been exposed to tobacco smoke. She has never used smokeless tobacco.   reports that she does not currently use alcohol after a past usage of about 4.0 standard drinks of alcohol per week.   reports that she does not currently use drugs after having used the following drugs: Cocaine(coke), Heroin, and Methamphetamines.        General ROS: Pertinent items are noted in  "HPI    Objective      Vitals:     Vitals:    04/19/24 0518 04/19/24 0525 04/19/24 0530 04/19/24 0700   BP:  126/80 123/74 115/66   BP Location:  Left arm Left arm Left arm   Patient Position:  Sitting Lying Sitting   Pulse:  97 87 82   Resp:   18    Temp:   98.2 °F (36.8 °C)    TempSrc:   Oral    SpO2:  98% 98%    Weight: 113 kg (248 lb 0.3 oz)      Height: 167.6 cm (66\")          Fetal Heart Rate Assessment:   Category 1    Vilas:   Every 5-6 mins     Physical Exam:     General Appearance:    Alert, cooperative, in no acute distress   Abdomen:     Soft, non-tender, no guarding, no rebound tenderness,       EFW 7#11 on formal US, 64%tile on 4/11/24   Pelvic Exam:    Pelvis adequate.    Presentation: Vertex    Cervix: 2-3/70/0, moderate clear fluid   Extremities:   Moves all extremities well, no edema, no cyanosis, no              redness   Skin:   No bleeding, bruising or rash   Neurologic:   No focal neurologic defect          Laboratory Results:   Lab Results (last 48 hours)       Procedure Component Value Units Date/Time    LABS SCANNED [[id number]] Resulted: 04/19/24     Updated: 04/19/24 0800    LABS SCANNED [[id number]] Resulted: 04/19/24     Updated: 04/19/24 0759    [place] Urine - Urine, Clean Catch [[id number]] Collected: 04/19/24 0548    Specimen: Urine, Clean Catch Updated: 04/19/24 0700     Extra Tube Hold for add-ons.     Comment: Auto resulted.       Comprehensive Metabolic Panel [[id number]]  (Abnormal) Collected: 04/19/24 0555    Specimen: Blood Updated: 04/19/24 0646     Glucose 106 mg/dL      BUN 10 mg/dL      Creatinine 0.62 mg/dL      Sodium 137 mmol/L      Potassium 4.0 mmol/L      Chloride 102 mmol/L      CO2 21.0 mmol/L      Calcium 9.4 mg/dL      Total Protein 6.6 g/dL      Albumin 3.4 g/dL      ALT (SGPT) 18 U/L      AST (SGOT) 18 U/L      Alkaline Phosphatase 112 U/L      Total Bilirubin 0.2 mg/dL      Globulin 3.2 gm/dL      A/G Ratio 1.1 g/dL      BUN/Creatinine Ratio 16.1     Anion Gap 14.0 " mmol/L      eGFR 123.8 mL/min/1.73     Narrative:      GFR Normal >60  Chronic Kidney Disease <60  Kidney Failure <15      Hepatitis C RNA, Quantitative, PCR (graph) [[id number]] Resulted: 08/31/23    Specimen: Blood Updated: 04/19/24 0632     External Hepatitis C, RNA Quant PCR Negative    Hepatitis C Antibody [[id number]] Resulted: 08/31/23    Specimen: Blood Updated: 04/19/24 0632     External Hepatitis C Ab Reactive    Hepatitis B Surface Antigen [[id number]] Resulted: 08/31/23    Specimen: Blood Updated: 04/19/24 0632     External Hepatitis B Surface Ag Negative    RPR [[id number]] Resulted: 08/31/23    Specimen: Blood Updated: 04/19/24 0632     External RPR Non-Reactive    Rubella Antibody, IgG [[id number]] Resulted: 08/31/23    Specimen: Blood Updated: 04/19/24 0632     External Rubella Qual Immune    HIV-1 Antibody, EIA [[id number]] Resulted: 08/31/23    Specimen: Blood Updated: 04/19/24 0632     External HIV Antibody Non-Reactive    Group B Streptococcus Culture - Swab, Vaginal/Rectum [[id number]] Resulted: 03/27/24    Specimen: Swab from Vaginal/Rectum Updated: 04/19/24 0632     External Strep Group B Ag Negative    CBC & Differential [[id number]]  (Abnormal) Collected: 04/19/24 0555    Specimen: Blood Updated: 04/19/24 0625    Narrative:      The following orders were created for panel order CBC & Differential.  Procedure                               Abnormality         Status                     ---------                               -----------         ------                     CBC Auto Differential[[id number]]        Abnormal            Final result                 Please view results for these tests on the individual orders.    CBC Auto Differential [[id number]]  (Abnormal) Collected: 04/19/24 0555    Specimen: Blood Updated: 04/19/24 0625     WBC 8.92 10*3/mm3      RBC 4.08 10*6/mm3      Hemoglobin 10.8 g/dL      Hematocrit 34.4 %      MCV 84.3 fL      MCH 26.5 pg      MCHC 31.4 g/dL      RDW 14.1 %       RDW-SD 43.7 fl      MPV 10.7 fL      Platelets 288 10*3/mm3      Neutrophil % 67.2 %      Lymphocyte % 24.0 %      Monocyte % 6.7 %      Eosinophil % 1.2 %      Basophil % 0.3 %      Immature Grans % 0.6 %      Neutrophils, Absolute 5.99 10*3/mm3      Lymphocytes, Absolute 2.14 10*3/mm3      Monocytes, Absolute 0.60 10*3/mm3      Eosinophils, Absolute 0.11 10*3/mm3      Basophils, Absolute 0.03 10*3/mm3      Immature Grans, Absolute 0.05 10*3/mm3      nRBC 0.0 /100 WBC     T Pallidum Antibody w/ reflex RPR (Syphilis) [[id number]] Collected: 2455    Specimen: Blood Updated: 24               Assessment & Plan     Principal Problem:    Encounter for induction of labor         Assessment:  1.  Intrauterine pregnancy at 39+4 wks gestation.    2.  Risk reducing IOL, with trial of labor after  section  3.  GBS status:Negative  4. CHTN controlled on labetalol therapy    Plan:  1. Pitocin induction.  Amniotomy performed with clear fluid, titrate pitocin as tolerated.   Patient has received extensive counseling on risk of induction with prior CD.  Counseled on 1% risk of uterine ruture. Discussed  maternal and fetal risk of uterine rupture including: bleeding, death, neurological deficits, and disability. Patient also counseled on risk of emergency  section with prior pfannesteil incision and uterine scar, puts her at high risk for abdominal wall adhesions and adhesions to surrounding structures like the bowel or bladder. Therefore an emergent CD could lead to more risks compared to a controlled  section. Patient also counseled that she can opt out of an TOLAC and ask for RCD to performed.     2. Plan of care has been reviewed with patient.  3.  Risks, benefits of treatment plan have been discussed.  4.  All questions have been answered.  5. CHTN on labetalol therapy- no s/sx of MEGHANA  6. Hep C with negative viral load, AST/ALT wnl   7. HSV continue PPX antiviral therapy, no evidence  of lesions on exam         Triny Underwood MD   4/19/2024   08:58 EDT

## 2024-04-19 NOTE — ANESTHESIA PROCEDURE NOTES
Epidural Block    Pre-sedation assessment completed: 4/19/2024 2:25 PM    Patient reassessed immediately prior to procedure    Patient location during procedure: OB  Start Time: 4/19/2024 2:27 PM  Stop Time: 4/19/2024 2:45 PM  Indication:post-op pain management and procedure for pain  Performed By  Anesthesiologist: Jonah Medley MD CRNA/CAA: Annabel Higgins CRNA  Preanesthetic Checklist  Completed: patient identified, IV checked, site marked, risks and benefits discussed, surgical consent, monitors and equipment checked, pre-op evaluation and timeout performed  Additional Notes  DPE done with 27G Kaylee, see nurses notes for vitals  Prep:  Pt Position:sitting  Sterile Tech:cap, gloves, mask and sterile barrier  Prep:chlorhexidine gluconate and isopropyl alcohol  Monitoring:blood pressure monitoring and continuous pulse oximetry  Epidural Block Procedure:  Approach:midline  Guidance:landmark technique  Location:lumbar  Level:3-4  Needle Type:Tuohy  Needle Gauge:17 G  Cath Size: 19 G  Loss of Resistance Medium: saline  Loss of Resistance: 9cm  Cath Depth at skin:15 cm  Paresthesia: none  Aspiration:negative  Test Dose:negative  Post Assessment:  Dressing:occlusive dressing applied  Pt Tolerance:patient tolerated the procedure well with no apparent complications  Complications:no

## 2024-04-19 NOTE — PLAN OF CARE
Goal Outcome Evaluation:  Plan of Care Reviewed With: patient, spouse           Outcome Evaluation: Pt progressing to 5-6cm on low dose pitocin. Pain managed with epidural today and rested off and on. VS stable/afebrile. Cat 1-2 with Dr Underwood reviewed. Contractions palpate moderate, resting tone soft. Adequate i/o's. Will continue to monitor.

## 2024-04-20 LAB
BASOPHILS # BLD AUTO: 0.02 10*3/MM3 (ref 0–0.2)
BASOPHILS NFR BLD AUTO: 0.2 % (ref 0–1.5)
DEPRECATED RDW RBC AUTO: 45.2 FL (ref 37–54)
EOSINOPHIL # BLD AUTO: 0.09 10*3/MM3 (ref 0–0.4)
EOSINOPHIL NFR BLD AUTO: 0.8 % (ref 0.3–6.2)
ERYTHROCYTE [DISTWIDTH] IN BLOOD BY AUTOMATED COUNT: 14.4 % (ref 12.3–15.4)
HCT VFR BLD AUTO: 30.3 % (ref 34–46.6)
HGB BLD-MCNC: 9.5 G/DL (ref 12–15.9)
IMM GRANULOCYTES # BLD AUTO: 0.05 10*3/MM3 (ref 0–0.05)
IMM GRANULOCYTES NFR BLD AUTO: 0.5 % (ref 0–0.5)
LYMPHOCYTES # BLD AUTO: 1.8 10*3/MM3 (ref 0.7–3.1)
LYMPHOCYTES NFR BLD AUTO: 16.3 % (ref 19.6–45.3)
MCH RBC QN AUTO: 26.9 PG (ref 26.6–33)
MCHC RBC AUTO-ENTMCNC: 31.4 G/DL (ref 31.5–35.7)
MCV RBC AUTO: 85.8 FL (ref 79–97)
MONOCYTES # BLD AUTO: 0.87 10*3/MM3 (ref 0.1–0.9)
MONOCYTES NFR BLD AUTO: 7.9 % (ref 5–12)
NEUTROPHILS NFR BLD AUTO: 74.3 % (ref 42.7–76)
NEUTROPHILS NFR BLD AUTO: 8.23 10*3/MM3 (ref 1.7–7)
NRBC BLD AUTO-RTO: 0 /100 WBC (ref 0–0.2)
PLATELET # BLD AUTO: 234 10*3/MM3 (ref 140–450)
PMV BLD AUTO: 10.3 FL (ref 6–12)
RBC # BLD AUTO: 3.53 10*6/MM3 (ref 3.77–5.28)
WBC NRBC COR # BLD AUTO: 11.06 10*3/MM3 (ref 3.4–10.8)

## 2024-04-20 PROCEDURE — 85025 COMPLETE CBC W/AUTO DIFF WBC: CPT | Performed by: STUDENT IN AN ORGANIZED HEALTH CARE EDUCATION/TRAINING PROGRAM

## 2024-04-20 RX ORDER — VALACYCLOVIR HYDROCHLORIDE 500 MG/1
500 TABLET, FILM COATED ORAL EVERY 12 HOURS SCHEDULED
Status: DISCONTINUED | OUTPATIENT
Start: 2024-04-20 | End: 2024-04-21 | Stop reason: HOSPADM

## 2024-04-20 RX ORDER — PRENATAL VIT/IRON FUM/FOLIC AC 27MG-0.8MG
1 TABLET ORAL DAILY
Status: DISCONTINUED | OUTPATIENT
Start: 2024-04-20 | End: 2024-04-21 | Stop reason: HOSPADM

## 2024-04-20 RX ORDER — DOCUSATE SODIUM 100 MG/1
100 CAPSULE, LIQUID FILLED ORAL 2 TIMES DAILY
Status: DISCONTINUED | OUTPATIENT
Start: 2024-04-20 | End: 2024-04-21 | Stop reason: HOSPADM

## 2024-04-20 RX ORDER — LABETALOL 100 MG/1
100 TABLET, FILM COATED ORAL EVERY 12 HOURS SCHEDULED
Status: DISCONTINUED | OUTPATIENT
Start: 2024-04-20 | End: 2024-04-21 | Stop reason: HOSPADM

## 2024-04-20 RX ORDER — ONDANSETRON 4 MG/1
4 TABLET, ORALLY DISINTEGRATING ORAL EVERY 8 HOURS PRN
Status: DISCONTINUED | OUTPATIENT
Start: 2024-04-20 | End: 2024-04-21 | Stop reason: HOSPADM

## 2024-04-20 RX ORDER — IBUPROFEN 600 MG/1
600 TABLET ORAL EVERY 6 HOURS PRN
Status: DISCONTINUED | OUTPATIENT
Start: 2024-04-20 | End: 2024-04-21 | Stop reason: HOSPADM

## 2024-04-20 RX ORDER — FERROUS SULFATE 324(65)MG
324 TABLET, DELAYED RELEASE (ENTERIC COATED) ORAL
Status: DISCONTINUED | OUTPATIENT
Start: 2024-04-21 | End: 2024-04-21 | Stop reason: HOSPADM

## 2024-04-20 RX ORDER — LAMOTRIGINE 25 MG/1
50 TABLET ORAL DAILY
Status: DISCONTINUED | OUTPATIENT
Start: 2024-04-20 | End: 2024-04-21 | Stop reason: HOSPADM

## 2024-04-20 RX ORDER — HYDROCORTISONE ACETATE PRAMOXINE HCL 2.5; 1 G/100G; G/100G
1 CREAM TOPICAL AS NEEDED
Status: DISCONTINUED | OUTPATIENT
Start: 2024-04-20 | End: 2024-04-21 | Stop reason: HOSPADM

## 2024-04-20 RX ORDER — ACETAMINOPHEN 325 MG/1
650 TABLET ORAL EVERY 6 HOURS PRN
Status: DISCONTINUED | OUTPATIENT
Start: 2024-04-20 | End: 2024-04-21 | Stop reason: HOSPADM

## 2024-04-20 RX ORDER — BISACODYL 10 MG
10 SUPPOSITORY, RECTAL RECTAL DAILY PRN
Status: DISCONTINUED | OUTPATIENT
Start: 2024-04-20 | End: 2024-04-21 | Stop reason: HOSPADM

## 2024-04-20 RX ADMIN — LABETALOL HYDROCHLORIDE 100 MG: 100 TABLET, FILM COATED ORAL at 21:46

## 2024-04-20 RX ADMIN — VALACYCLOVIR HYDROCHLORIDE 500 MG: 500 TABLET, FILM COATED ORAL at 21:46

## 2024-04-20 RX ADMIN — ACETAMINOPHEN 650 MG: 325 TABLET, FILM COATED ORAL at 16:16

## 2024-04-20 RX ADMIN — IBUPROFEN 600 MG: 600 TABLET, FILM COATED ORAL at 18:38

## 2024-04-20 RX ADMIN — LABETALOL HYDROCHLORIDE 100 MG: 100 TABLET, FILM COATED ORAL at 00:35

## 2024-04-20 RX ADMIN — DOCUSATE SODIUM 100 MG: 100 CAPSULE, LIQUID FILLED ORAL at 00:35

## 2024-04-20 RX ADMIN — DOCUSATE SODIUM 100 MG: 100 CAPSULE, LIQUID FILLED ORAL at 21:46

## 2024-04-20 RX ADMIN — LABETALOL HYDROCHLORIDE 100 MG: 100 TABLET, FILM COATED ORAL at 08:13

## 2024-04-20 RX ADMIN — VALACYCLOVIR HYDROCHLORIDE 500 MG: 500 TABLET, FILM COATED ORAL at 08:13

## 2024-04-20 RX ADMIN — LAMOTRIGINE 50 MG: 25 TABLET ORAL at 08:13

## 2024-04-20 RX ADMIN — PRENATAL VITAMINS-IRON FUMARATE 27 MG IRON-FOLIC ACID 0.8 MG TABLET 1 TABLET: at 08:13

## 2024-04-20 RX ADMIN — IBUPROFEN 600 MG: 600 TABLET, FILM COATED ORAL at 08:13

## 2024-04-20 NOTE — PROGRESS NOTES
JOAN Silver  Postpartum Note    Subjective   Postpartum Day 1:      Patient without complaints. Her pain is well controlled with prescribed pain medications. She is ambulating and voiding well.  Bleeding is appropriate for pp period.      Objective     Vitals:  Vitals:    24 2323 24 2345 24 0020 24 0700   BP: 104/71 107/59 127/73 115/75   BP Location: Left arm Left arm Left arm Left arm   Patient Position: Sitting Sitting Lying Sitting   Pulse: 108 105 96 98   Resp:    Temp:   97.6 °F (36.4 °C) 97.6 °F (36.4 °C)   TempSrc:   Oral Oral   SpO2: 98%  96% 97%   Weight:       Height:           Physical Exam:  General:  no acute distress.  Abdomen: Fundus firm below umbilicus, nontender  CV: RRR  Resp: non-labored respirations, clear to auscultation bilaterally    Extremities: moves all extremities well, no cyanosis or erythema, No edema      Labs:  Results from last 7 days   Lab Units 24  0517 24  0555   WBC 10*3/mm3 11.06* 8.92   HEMOGLOBIN g/dL 9.5* 10.8*   HEMATOCRIT % 30.3* 34.4   PLATELETS 10*3/mm3 234 288     Results from last 7 days   Lab Units 24  0555   GLUCOSE mg/dL 106*          Feeding method: Breastfeeding Status: Yes     Blood Type: RH Positive        Assessment & Plan     Principal Problem:    Encounter for induction of labor  Active Problems:     (vaginal birth after )    Hepatitis C infection      Ning Gomez is Day 1  post-partum from a        Plan:    Meeting postpartum goals.   Rhogam not indicated, Rh positive.   Anemia, asymptomatic, continue PO iron.  CHTN: controlled on labetalol therapy, BP check within 1 week after discharge    Voiding spontaneously.   Plan for discharge in AM.        Triny Underwood MD  2024  11:12 EDT

## 2024-04-20 NOTE — LACTATION NOTE
This note was copied from a baby's chart.  Provided mother with community support group info, nipple cream and gel pads, instructed on use. Basic teaching done. Denies history of breast surgery. States she routinely takes Lamictal and Labetalol. Has Melanie Stride and a Medela pumps. States that she wanted to breastfeed her first child but was Hep C positive and did not breastfeed. Smaller sized breasts, widely spaced. Instructed on manual breast massage with expression. In football hold, baby latched wide, very infrequent audible swallowing. Mother reported increase in uterine cramping as feeding progressed. Instructed on gentle stimulation to keep baby feeding. Praised efforts. Encouraged to call as needed.

## 2024-04-20 NOTE — PLAN OF CARE
Goal Outcome Evaluation:      Patient ambulates around room and performs self care independently. Patient voids spontaneously without difficulty and appears to be adjusting well and bonding well with infant. Will continue to monitor.

## 2024-04-20 NOTE — L&D DELIVERY NOTE
[facility]  Vaginal Delivery Note    Diagnosis     Patient is a 28 y.o. female  currently at 39w4d, who presents for induction of labor, she desires trial of labor after  section.  Pregnancy c/b: Prior  section x 1, chronic hypertension on labetalol, genital HSV, hepatitis C infection with negative viral load, history of intrahepatic cholestasis of pregnancy       Delivery     Delivery:  Vaginal birth after  section     Date of Delivery:  2024 at 2125   Anesthesia:  Epidural   Delivering clinician: Triny Underwood MD      Delivery narrative:  Patient presented for IOL at term, she desired TOLAC. She progressed along the normal labor curve following pitocin induction and amniotomy. Once completely dilated, she pushed for approximately 6 minutes prior to delivery. She delivered a LVFI in a clean field, position LASHAE. Infants head delivered atraumatically, followed by delivery of the rest of the infants body. There were no nuchal cords. Cord was clamped and cut and infant was passed to mothers abdomen. Infant had good cry, color, and tone, and was moving all 4 extremities. Cord blood was obtained and sent for analysis. Placenta was delivered spontaneously and intact with a 3 vessel cord. Pitocin was given IV and fundal massage was applied for hemostasis.  The vagina and rectum were examined and there were no lacerations requiring repair. Good hemostasis was noted following delivery.       Infant    Findings: VFI     Apgars: 9 & 9 at 1 and 5 minutes.      Placenta, Cord, and Fluid     Delayed cord clamping performed per routine.       Placenta delivered spontaneous, intact  3VC      Bimanual massage and Pitocin 30 units in 500 mL bolus given after placental delivery.  There was good hemostasis and a firm uterine tone.        Lacerations       had no lacerations.   Quantitiative Blood Loss  150  mL     Sponge and needle counts correct x 2.     Disposition  Mother to Mother Baby/Postpartum   in stable condition currently.  Baby to remains with mom  in stable condition currently.      Triny Underwood MD  04/19/24  21:43 EDT

## 2024-04-20 NOTE — PLAN OF CARE
Problem: Adult Inpatient Plan of Care  Goal: Plan of Care Review  Outcome: Ongoing, Progressing  Flowsheets (Taken 4/20/2024 0727)  Progress: improving  Plan of Care Reviewed With: patient  Outcome Evaluation: Pt resting between feeds, tolerating breast feeding fairly. IV d/c. Pt appropriate with infant. Pain controlled with medication and rest. No new concerns at this time.  Goal: Patient-Specific Goal (Individualized)  Outcome: Ongoing, Progressing  Goal: Absence of Hospital-Acquired Illness or Injury  Outcome: Ongoing, Progressing  Intervention: Identify and Manage Fall Risk  Recent Flowsheet Documentation  Taken 4/20/2024 0251 by Lorri Weaver RN  Safety Promotion/Fall Prevention: safety round/check completed  Taken 4/20/2024 0020 by Lorri Weaver RN  Safety Promotion/Fall Prevention:   activity supervised   assistive device/personal items within reach   safety round/check completed  Intervention: Prevent Skin Injury  Recent Flowsheet Documentation  Taken 4/20/2024 0020 by Lorri Weaver RN  Body Position: position changed independently  Intervention: Prevent and Manage VTE (Venous Thromboembolism) Risk  Recent Flowsheet Documentation  Taken 4/20/2024 0020 by Lorri Weaver RN  Activity Management:   ambulated to bathroom   up ad wayne  Intervention: Prevent Infection  Recent Flowsheet Documentation  Taken 4/20/2024 0020 by Lorri Weaver RN  Infection Prevention:   visitors restricted/screened   rest/sleep promoted   single patient room provided   personal protective equipment utilized   hand hygiene promoted  Goal: Optimal Comfort and Wellbeing  Outcome: Ongoing, Progressing  Intervention: Provide Person-Centered Care  Recent Flowsheet Documentation  Taken 4/20/2024 0020 by Lorri Weaver RN  Trust Relationship/Rapport:   care explained   choices provided   thoughts/feelings acknowledged  Goal: Readiness for Transition of Care  Outcome: Ongoing, Progressing     Problem: Adjustment to  Role Transition (Postpartum Vaginal Delivery)  Goal: Successful Maternal Role Transition  Outcome: Ongoing, Progressing  Intervention: Support Maternal Role Transition  Recent Flowsheet Documentation  Taken 4/20/2024 0020 by Lorri Weaver RN  Supportive Measures: active listening utilized  Parent/Child Attachment Promotion:   caring behavior modeled   skin-to-skin contact encouraged     Problem: Bleeding (Postpartum Vaginal Delivery)  Goal: Hemostasis  Outcome: Ongoing, Progressing     Problem: Infection (Postpartum Vaginal Delivery)  Goal: Absence of Infection Signs/Symptoms  Outcome: Ongoing, Progressing  Intervention: Prevent or Manage Infection  Recent Flowsheet Documentation  Taken 4/20/2024 0020 by Lorri Weaver RN  Infection Management: aseptic technique maintained  Perineal Care:   absorbent brief/pad changed   perineal hygiene encouraged   perineal spray bottle/warm water use encouraged   perineum cleansed     Problem: Pain (Postpartum Vaginal Delivery)  Goal: Acceptable Pain Control  Outcome: Ongoing, Progressing     Problem: Urinary Retention (Postpartum Vaginal Delivery)  Goal: Effective Urinary Elimination  Outcome: Ongoing, Progressing     Problem: Breastfeeding  Goal: Effective Breastfeeding  Outcome: Ongoing, Progressing  Intervention: Support Exclusive Breastfeeding Success  Recent Flowsheet Documentation  Taken 4/20/2024 0020 by Lorri Weaver RN  Supportive Measures: active listening utilized  Intervention: Promote Effective Breastfeeding  Recent Flowsheet Documentation  Taken 4/20/2024 0020 by Lorri Weaver RN  Parent/Child Attachment Promotion:   caring behavior modeled   skin-to-skin contact encouraged   Goal Outcome Evaluation:  Plan of Care Reviewed With: patient        Progress: improving  Outcome Evaluation: Pt resting between feeds, tolerating breast feeding fairly. IV d/c. Pt appropriate with infant. Pain controlled with medication and rest. No new concerns at this  time.

## 2024-04-21 VITALS
SYSTOLIC BLOOD PRESSURE: 114 MMHG | WEIGHT: 238.98 LBS | HEART RATE: 91 BPM | HEIGHT: 66 IN | OXYGEN SATURATION: 98 % | BODY MASS INDEX: 38.41 KG/M2 | DIASTOLIC BLOOD PRESSURE: 74 MMHG | RESPIRATION RATE: 17 BRPM | TEMPERATURE: 97.8 F

## 2024-04-21 RX ORDER — ACETAMINOPHEN AND CODEINE PHOSPHATE 120; 12 MG/5ML; MG/5ML
1 SOLUTION ORAL DAILY
Qty: 28 TABLET | Refills: 6 | Status: SHIPPED | OUTPATIENT
Start: 2024-04-21 | End: 2025-04-21

## 2024-04-21 RX ADMIN — LAMOTRIGINE 50 MG: 25 TABLET ORAL at 08:00

## 2024-04-21 RX ADMIN — IBUPROFEN 600 MG: 600 TABLET, FILM COATED ORAL at 07:59

## 2024-04-21 RX ADMIN — LABETALOL HYDROCHLORIDE 100 MG: 100 TABLET, FILM COATED ORAL at 10:32

## 2024-04-21 RX ADMIN — FERROUS SULFATE TAB EC 324 MG (65 MG FE EQUIVALENT) 324 MG: 324 (65 FE) TABLET DELAYED RESPONSE at 07:59

## 2024-04-21 RX ADMIN — PRENATAL VITAMINS-IRON FUMARATE 27 MG IRON-FOLIC ACID 0.8 MG TABLET 1 TABLET: at 07:59

## 2024-04-21 RX ADMIN — DOCUSATE SODIUM 100 MG: 100 CAPSULE, LIQUID FILLED ORAL at 07:59

## 2024-04-21 RX ADMIN — VALACYCLOVIR HYDROCHLORIDE 500 MG: 500 TABLET, FILM COATED ORAL at 08:00

## 2024-04-21 NOTE — LACTATION NOTE
This note was copied from a baby's chart.  Mother reports feedings are going ok. Denies signs of breasts changes at this point. Nipples are tender, no cracks, bleeding or blisters noted. Reinforced nipple care and nipple care products. Has offered the baby small amounts of formula. Encouraged to pump after breast feeding to stimulate milk production. Encouraged to watch volume. Plans for discharge today. Discussed first night at home. Provided with  discharge weight ticket and lactation contact card. Encouraged to call as needed.

## 2024-04-21 NOTE — PLAN OF CARE
Problem: Adult Inpatient Plan of Care  Goal: Plan of Care Review  Outcome: Ongoing, Progressing  Flowsheets (Taken 4/21/2024 0418)  Progress: improving  Plan of Care Reviewed With:   patient   spouse  Goal: Patient-Specific Goal (Individualized)  Outcome: Ongoing, Progressing  Goal: Absence of Hospital-Acquired Illness or Injury  Outcome: Ongoing, Progressing  Intervention: Identify and Manage Fall Risk  Recent Flowsheet Documentation  Taken 4/21/2024 0416 by Melina Glover RN  Safety Promotion/Fall Prevention: safety round/check completed  Taken 4/21/2024 0221 by Melina Glover RN  Safety Promotion/Fall Prevention: safety round/check completed  Intervention: Prevent Infection  Recent Flowsheet Documentation  Taken 4/21/2024 0416 by Melina Glover RN  Infection Prevention: environmental surveillance performed  Taken 4/21/2024 0221 by Melina Glover RN  Infection Prevention: environmental surveillance performed  Goal: Optimal Comfort and Wellbeing  Outcome: Ongoing, Progressing  Goal: Readiness for Transition of Care  Outcome: Ongoing, Progressing     Problem: Adjustment to Role Transition (Postpartum Vaginal Delivery)  Goal: Successful Maternal Role Transition  Outcome: Ongoing, Progressing     Problem: Bleeding (Postpartum Vaginal Delivery)  Goal: Hemostasis  Outcome: Ongoing, Progressing     Problem: Infection (Postpartum Vaginal Delivery)  Goal: Absence of Infection Signs/Symptoms  Outcome: Ongoing, Progressing     Problem: Pain (Postpartum Vaginal Delivery)  Goal: Acceptable Pain Control  Outcome: Ongoing, Progressing     Problem: Urinary Retention (Postpartum Vaginal Delivery)  Goal: Effective Urinary Elimination  Outcome: Ongoing, Progressing     Problem: Breastfeeding  Goal: Effective Breastfeeding  Outcome: Ongoing, Progressing   Goal Outcome Evaluation:  Plan of Care Reviewed With: patient, spouse        Progress: improving

## 2024-04-21 NOTE — DISCHARGE SUMMARY
[facility]  Delivery Discharge Summary    Primary OB Clinician: Triny Underwood MD    Admission Diagnosis:  Principal Problem:    Encounter for induction of labor  Active Problems:     (vaginal birth after )    Hepatitis C infection      Discharge Diagnosis:  Status post vaginal birth after  section   Chronic hypertension, well controlled   Genital herpes simplex virus  History of intrahepatic cholestasis of pregnancy     Gestational Age: 39w4d    Date of Delivery: 2024     Delivery Type: , Spontaneous      Intrapartum Course: Patient presented for pitocin induction of labor with history of CD x 1. She had successful . See delivery note for details.    Postpartum Course:  Uncomplicated pp course. On PPD1, Pt is tolerating po well, ambulating and voiding without difficulty.  Pain is well controlled. Bleeding is minimal/ appropriate for pp state. She denies headache, vision changes, chest pain, dyspnea, RUQ pain, palpitations, or worsening swelling. BP controlled on home dose of labetalol.     Physical Exam:    Vitals:   Vitals:    24 2148 24 0416 24 0747 24 1143   BP: 101/63 101/62 112/76 114/74   BP Location: Left arm Left arm Left arm Left arm   Patient Position: Sitting Sitting Sitting Sitting   Pulse: 93 86 79 91   Resp: 17 18 17    Temp: 98.5 °F (36.9 °C) 98.1 °F (36.7 °C) 97.5 °F (36.4 °C) 97.8 °F (36.6 °C)   TempSrc: Oral Oral Oral Oral   SpO2: 96% 97% 97% 98%   Weight: 108 kg (238 lb 15.7 oz)      Height:         Temp (24hrs), Av °F (36.7 °C), Min:97.5 °F (36.4 °C), Max:98.5 °F (36.9 °C)      General Appearance:    Alert, cooperative, in no acute distress   Abdomen:    Fundus firm below umbilicus, nontender  CV: RRR  Resp: non-labored respirations, clear to auscultation bilaterally             Extremities: Moves all extremities well, trace pedal edema, no cyanosis, no redness.     Labs:   Results from last 7 days   Lab Units 24  0521  04/19/24  0555   WBC 10*3/mm3 11.06* 8.92   HEMOGLOBIN g/dL 9.5* 10.8*   HEMATOCRIT % 30.3* 34.4   PLATELETS 10*3/mm3 234 288     Results from last 7 days   Lab Units 04/19/24  0555   GLUCOSE mg/dL 106*         Discharge Medications:     Discharge Medications        New Medications        Instructions Start Date   norethindrone 0.35 MG tablet  Commonly known as: Ortho Micronor   0.35 mg, Oral, Daily             Continue These Medications        Instructions Start Date   labetalol 100 MG tablet  Commonly known as: NORMODYNE   100 mg, Oral, Every 12 Hours Scheduled      lamoTRIgine 25 MG tablet  Commonly known as: LaMICtal   50 mg, Oral, 2 Times Daily      prenatal (CLASSIC) vitamin 28-0.8 MG tablet tablet  Generic drug: prenatal vitamin   1 tablet, Oral, Daily      valACYclovir 1000 MG tablet  Commonly known as: VALTREX   TAKE 1 TABLET BY MOUTH EVERY DAY               Feeding method: Breastfeeding Status: Yes    Blood Type: RH Positive      Plan:  Discharge to home.    Follow-up appointment with Dr. Triny Underwood in 6 weeks. BP check with APRN in 1 week.   Patient given strict counseling for preeclampsia precaution s/sx and warning signs prior to discharge.   All discharge instructions were reviewed with pt including bleeding warnings, s/sx of pp depression, and warning signs in the pp period for which to seek medical attention including but not limited to s/sx of hypertension and thromboembolism.  POPs for contraception Rx sent at discharge.

## 2024-04-21 NOTE — PLAN OF CARE
Goal Outcome Evaluation:  Plan of Care Reviewed With: patient, significant other        Progress: improving     Pt postpartum bleeding WNL.  Pt ambulating and voiding appropriately.  Pt states pain is controlled with PRN motrin and tylenol.  Breastfeeding improving and bonding well with infant.  VSS, afebrile. Pt seen by MD and appropriate for discharge.  Discharge instructions provided and questions answered.  No concerns at this time.

## 2024-04-22 NOTE — PROGRESS NOTES
Case Management Discharge Note                Selected Continued Care - Discharged on 4/21/2024 Admission date: 4/19/2024 - Discharge disposition: Home or Self Care      Destination    No services have been selected for the patient.                Durable Medical Equipment    No services have been selected for the patient.                Dialysis/Infusion    No services have been selected for the patient.                Home Medical Care    No services have been selected for the patient.                Therapy    No services have been selected for the patient.                Community Resources    No services have been selected for the patient.                Community & DME    No services have been selected for the patient.                         Final Discharge Disposition Code: 01 - home or self-care

## 2024-04-22 NOTE — PAYOR COMM NOTE
"          DELIVERY DATE/TIME     2024  9:25 PM   /PARA    3/2  SEX       Female  BIRTH Wt (GMS OR LBS)    3810 grams  LENGTH: IN      20 inches  APGARS,        TYPE:       VAGINAL       AUTHORIZATION PENDING:   PLEASE CALL OR FAX DETERMINATION TO CONTACT BELOW. THANK YOU.        Makayla Krishnan RN MSN  /UR  [facility]  [phone] office  [phone] fax  [email or web address]    [facility]  NPI: [id number]  Tax: [id number]            Ning Reid (28 y.o. Female)               Date of Birth   1995    Social Security Number       Address   [address] [place] [address] [place] [facility]    Home Phone   [phone]    MRN   [id number]       Voodoo   Non-Zoroastrianism    Marital Status   Single                            Admission Date   24    Admission Type   Elective    Admitting Provider   Triny Underwood MD    Attending Provider       Department, Room/Bed   [facility] MOTHER BABY, M401/1       Discharge Date   2024    Discharge Disposition   Home or Self Care    Discharge Destination                                 Attending Provider: (none)   Allergies: No Known Allergies    Isolation: None   Infection: None   Code Status: Prior    Ht: 167.6 cm (66\")   Wt: 108 kg (238 lb 15.7 oz)    Admission Cmt: None   Principal Problem: Encounter for induction of labor [Z34.90]                   Active Insurance as of 2024       Primary Coverage       Payor Plan Insurance Group Employer/Plan Group    [facility] BLUE [facility] BLUE [facility] PPO [id number]       Payor Plan Address Payor Plan Phone Number Payor Plan Fax Number Effective Dates    [address] [phone]  2021 - None Entered    [facility] [id number]         Subscriber Name Subscriber Birth Date Member ID       NING REID 1995 [id number]                     Emergency Contacts        (Rel.) Home Phone Work Phone Mobile Phone    MIRIAM, " ADRIANA (Significant Other) [phone] -- --                 Operative/Procedure Notes (all)        Triny Underwood MD at 24 5589  Version 1 of 1          [facility]  Vaginal Delivery Note    Diagnosis     Patient is a 28 y.o. female  currently at 39w4d, who presents for induction of labor, she desires trial of labor after  section.  Pregnancy c/b: Prior  section x 1, chronic hypertension on labetalol, genital HSV, hepatitis C infection with negative viral load, history of intrahepatic cholestasis of pregnancy       Delivery     Delivery:  Vaginal birth after  section     Date of Delivery:  2024 at 2125   Anesthesia:  Epidural   Delivering clinician: Triny Underwood MD      Delivery narrative:  Patient presented for IOL at term, she desired TOLAC. She progressed along the normal labor curve following pitocin induction and amniotomy. Once completely dilated, she pushed for approximately 6 minutes prior to delivery. She delivered a LVFI in a clean field, position LASHAE. Infants head delivered atraumatically, followed by delivery of the rest of the infants body. There were no nuchal cords. Cord was clamped and cut and infant was passed to mothers abdomen. Infant had good cry, color, and tone, and was moving all 4 extremities. Cord blood was obtained and sent for analysis. Placenta was delivered spontaneously and intact with a 3 vessel cord. Pitocin was given IV and fundal massage was applied for hemostasis.  The vagina and rectum were examined and there were no lacerations requiring repair. Good hemostasis was noted following delivery.       Infant    Findings: VFI     Apgars: 9 & 9 at 1 and 5 minutes.      Placenta, Cord, and Fluid     Delayed cord clamping performed per routine.       Placenta delivered spontaneous, intact  3VC      Bimanual massage and Pitocin 30 units in 500 mL bolus given after placental delivery.  There was good hemostasis and a firm uterine tone.         Lacerations       had no lacerations.   Quantitiative Blood Loss  150  mL     Sponge and needle counts correct x 2.     Disposition  Mother to Mother Baby/Postpartum  in stable condition currently.  Baby to remains with mom  in stable condition currently.      Triny Underwood MD  24  21:43 EDT        Electronically signed by Triny Underwood MD at 24 9758          Physician Progress Notes (all)        Triny Underwood MD at 24 1112          [facility]  Postpartum Note    Subjective   Postpartum Day 1:      Patient without complaints. Her pain is well controlled with prescribed pain medications. She is ambulating and voiding well.  Bleeding is appropriate for pp period.      Objective     Vitals:  Vitals:    24 2323 24 2345 24 0020 24 0700   BP: 104/71 107/59 127/73 115/75   BP Location: Left arm Left arm Left arm Left arm   Patient Position: Sitting Sitting Lying Sitting   Pulse: 108 105 96 98   Resp:    Temp:   97.6 °F (36.4 °C) 97.6 °F (36.4 °C)   TempSrc:   Oral Oral   SpO2: 98%  96% 97%   Weight:       Height:           Physical Exam:  General:  no acute distress.  Abdomen: Fundus firm below umbilicus, nontender  CV: RRR  Resp: non-labored respirations, clear to auscultation bilaterally    Extremities: moves all extremities well, no cyanosis or erythema, No edema      Labs:  Results from last 7 days   Lab Units 24  0517 24  0555   WBC 10*3/mm3 11.06* 8.92   HEMOGLOBIN g/dL 9.5* 10.8*   HEMATOCRIT % 30.3* 34.4   PLATELETS 10*3/mm3 234 288     Results from last 7 days   Lab Units 24  0555   GLUCOSE mg/dL 106*          Feeding method: Breastfeeding Status: Yes     Blood Type: RH Positive        Assessment & Plan     Principal Problem:    Encounter for induction of labor  Active Problems:     (vaginal birth after )    Hepatitis C infection      Ning Gomez is Day 1  post-partum from a        Plan:    Meeting postpartum  "goals.   Rhogam not indicated, Rh positive.   Anemia, asymptomatic, continue PO iron.  CHTN: controlled on labetalol therapy, BP check within 1 week after discharge    Voiding spontaneously.   Plan for discharge in AM.        Triny Underwood MD  4/20/2024  11:12 EDT                Electronically signed by Triny Underwood MD at 04/20/24 1333       Triny Underwood MD at 04/19/24 1742          [facility]  Obstetric Progress Note    Subjective   Patient resting in bed. Pain controlled with epidural in place, she has no concerns.   Throughout the day, having some difficulty with fetal tracing even with movement. Counseled patient on internal monitors for both FHR and contractions due to difficulty tracing contractions.     Objective     Vitals:  Vitals:    04/19/24 1647 04/19/24 1651 04/19/24 1656 04/19/24 1701   BP: 94/47   99/56   BP Location:       Patient Position:       Pulse: 73 81 78 81   Resp:       Temp:       TempSrc:       SpO2:  98% 98% 99%   Weight:       Height:         Flowsheet Rows      Flowsheet Row First Filed Value   Admission Height 167.6 cm (66\") Documented at 04/19/2024 0518   Admission Weight 113 kg (248 lb 0.3 oz) Documented at 04/19/2024 0518            Intake/Output Summary (Last 24 hours) at 4/19/2024 1743  Last data filed at 4/19/2024 1506  Gross per 24 hour   Intake 940 ml   Output 700 ml   Net 240 ml       Fetal Heart Rate Assessment:   Cat I-II, intermittent variable and early deceleration   [place]:  Every 3-4 mins    Physical Exam:  General: Patient is in no acute distress    Pelvic Exam: 5/90/0            Assessment & Plan     Principal Problem:    Encounter for induction of labor         Assessment:  1. Pitocin induction.  Amniotomy performed with clear fluid, titrate pitocin as tolerated.   Patient has received extensive counseling on risk of induction with prior CD.  Counseled on 1% risk of uterine ruture. Discussed  maternal and fetal risk of uterine rupture including: bleeding, death, " neurological deficits, and disability. Patient also counseled on risk of emergency  section with prior pfannesteil incision and uterine scar, puts her at high risk for abdominal wall adhesions and adhesions to surrounding structures like the bowel or bladder. Therefore an emergent CD could lead to more risks compared to a controlled  section. Patient also counseled that she can opt out of an TOLAC and ask for RCD to performed.   1720: Cat I-II, currently on pitocin, counseled patient on difficulty tracing FHR and CTX at times and placing internals, patient has high risk labor with TOLAC so shared decision led to placement of both with known negative viral load, vaginal delivery anticipated.     2. Plan of care has been reviewed with patient.  3.  Risks, benefits of treatment plan have been discussed.  4.  All questions have been answered.  5. CHTN on labetalol therapy- no s/sx of MEGHANA, order placed  6. Hep C with negative viral load, AST/ALT wnl   7. HSV continue PPX antiviral therapy, no evidence of lesions on exam, reports taking medication at home, order placed for this PM        Triny Underwood MD  2024  17:43 EDT        Electronically signed by Triny Underwood MD at 24 9384       Consult Notes (all)    No notes of this type exist for this encounter.

## 2024-04-25 LAB
LAB AP CASE REPORT: NORMAL
LAB AP DIAGNOSIS COMMENT: NORMAL
PATH REPORT.FINAL DX SPEC: NORMAL
PATH REPORT.GROSS SPEC: NORMAL

## 2024-04-26 RX ORDER — LABETALOL 100 MG/1
100 TABLET, FILM COATED ORAL EVERY 12 HOURS SCHEDULED
Qty: 60 TABLET | Refills: 1 | Status: SHIPPED | OUTPATIENT
Start: 2024-04-26

## 2024-04-30 ENCOUNTER — MATERNAL SCREENING (OUTPATIENT)
Dept: CALL CENTER | Facility: HOSPITAL | Age: 29
End: 2024-04-30
Payer: COMMERCIAL

## 2024-04-30 NOTE — OUTREACH NOTE
Maternal Screening Survey      Flowsheet Row Responses   Facility patient discharged from? [facility]   Attempt successful? Yes   Call start time    Call end time    EPD Scale: Able to Laugh 0-->as much as she always could   EPD Scale: Looked Forward 0-->as much as she ever did   EPD Scale: Blamed Self 0-->no, never   EPD Scale: Been Anxious 1-->hardly ever   EPD Scale: Felt Panicky 0-->no, not at all   EPD Scale: Things Getting on Top 0-->no, has been coping as well as ever   EPD Scale: Difficulty Sleeping 0-->no, not at all   EPD Scale: Sad or Miserable 0-->no, not at all   EPD Scale: Crying 1-->only occasionally   EPD Scale: Thought of Harming Self 0-->never   [place]  Depression Score 2   Did any of your parents have problems with alcohol or drug use? No   Do any of your peers have problems with alcohol or drug use? No   Does your partner have problems with alcohol or drug use? No   Before you were pregnant did you have problems with alcohol or drug use? (past) Yes   In the past month, did you drink beer, wine, liquor or use any other drugs? (pregnancy) No   Maternal Screening call completed Yes   Call end time               SANDRA LIU - Registered Nurse

## 2024-04-30 NOTE — OUTREACH NOTE
Maternal Screening Survey      Flowsheet Row Responses   Facility patient discharged from? [facility]   Attempt successful? No   Unsuccessful attempts Attempt 1  [left msg  on vm]              SANDRA LIU - Registered Nurse

## 2024-05-03 RX ORDER — LABETALOL 100 MG/1
100 TABLET, FILM COATED ORAL EVERY 12 HOURS SCHEDULED
Qty: 180 TABLET | Refills: 0 | Status: SHIPPED | OUTPATIENT
Start: 2024-05-03 | End: 2024-08-01

## 2024-08-07 ENCOUNTER — OFFICE VISIT (OUTPATIENT)
Dept: FAMILY MEDICINE CLINIC | Facility: CLINIC | Age: 29
End: 2024-08-07
Payer: COMMERCIAL

## 2024-08-07 VITALS
WEIGHT: 231 LBS | HEART RATE: 85 BPM | DIASTOLIC BLOOD PRESSURE: 78 MMHG | SYSTOLIC BLOOD PRESSURE: 128 MMHG | OXYGEN SATURATION: 97 % | HEIGHT: 66 IN | BODY MASS INDEX: 37.12 KG/M2

## 2024-08-07 DIAGNOSIS — M54.50 ACUTE RIGHT-SIDED LOW BACK PAIN WITHOUT SCIATICA: ICD-10-CM

## 2024-08-07 DIAGNOSIS — F31.62 BIPOLAR DISORDER, CURRENT EPISODE MIXED, MODERATE: ICD-10-CM

## 2024-08-07 DIAGNOSIS — I10 PRIMARY HYPERTENSION: Primary | ICD-10-CM

## 2024-08-07 PROCEDURE — 99214 OFFICE O/P EST MOD 30 MIN: CPT | Performed by: NURSE PRACTITIONER

## 2024-08-07 RX ORDER — LISINOPRIL 5 MG/1
5 TABLET ORAL DAILY
Qty: 30 TABLET | Refills: 5 | Status: SHIPPED | OUTPATIENT
Start: 2024-08-07

## 2024-08-07 RX ORDER — LAMOTRIGINE 25 MG/1
50 TABLET ORAL 2 TIMES DAILY
Qty: 360 TABLET | Refills: 1 | Status: SHIPPED | OUTPATIENT
Start: 2024-08-07

## 2024-08-07 NOTE — PROGRESS NOTES
"Chief Complaint  Follow-up (Follow up from ER visit )    Subjective        Ning Gomez presents to [facility]  History of Present Illness    Patient presents for follow-up visit regarding recent ER stay.  She was seen on 7/31 and 8/2 with complaints of low back pain and left leg pain.  X-ray showed no acute fracture or dislocation.  Patient was diagnosed with sciatica and discharged on muscle relaxants. Today, she reports pain has improved.     Patient has history of mixed BPD -   Symptoms are stable on Lamictal 50 mg BID.     Hypertension, stable on Labetalol 100 mg daily. She was previously on Lisinopril, stopped due to pregnancy.     Objective   Vital Signs:  /78 (BP Location: Left arm, Patient Position: Sitting, Cuff Size: Adult)   Pulse 85   Ht 167.6 cm (66\")   Wt 105 kg (231 lb)   SpO2 97%   BMI 37.28 kg/m²   Estimated body mass index is 37.28 kg/m² as calculated from the following:    Height as of this encounter: 167.6 cm (66\").    Weight as of this encounter: 105 kg (231 lb).             Physical Exam  Constitutional:       Appearance: Normal appearance.   HENT:      Head: Normocephalic.   Cardiovascular:      Rate and Rhythm: Normal rate and regular rhythm.   Pulmonary:      Effort: Pulmonary effort is normal.      Breath sounds: Normal breath sounds.   Abdominal:      General: Abdomen is flat. Bowel sounds are normal.      Palpations: Abdomen is soft.   Musculoskeletal:         General: Normal range of motion.      Cervical back: Neck supple.      Right lower leg: No edema.      Left lower leg: No edema.   Skin:     General: Skin is warm and dry.   Neurological:      Mental Status: She is alert and oriented to person, place, and time.      Gait: Gait is intact.   Psychiatric:         Attention and Perception: Attention normal.         Mood and Affect: Mood normal.         Speech: Speech normal.        Result Review :        Data reviewed : Radiologic studies " Lumbar x-ray and Recent hospitalization notes [facility]             Assessment and Plan     Diagnoses and all orders for this visit:    1. Primary hypertension (Primary)  Assessment & Plan:  D/C Labetalol  Restart Lisinopril 5 mg daily  Monitor BP readings at home, call if > 140/90      2. Bipolar disorder, current episode mixed, moderate  Assessment & Plan:  Stable on Lamictal 50 mg BID      3. Acute right-sided low back pain without sciatica  Assessment & Plan:  Reviewed ER notes  Reviewed x-ray  Improving  Recommended stretching exercises provided at ER  Massage/heat recommended  May use Flexeril PRN  Call if sx persist or worsen      Other orders  -     lisinopril (PRINIVIL,ZESTRIL) 5 MG tablet; Take 1 tablet by mouth Daily.  Dispense: 30 tablet; Refill: 5  -     lamoTRIgine (LaMICtal) 25 MG tablet; Take 2 tablets by mouth 2 (Two) Times a Day.  Dispense: 360 tablet; Refill: 1           I spent 30 minutes caring for Ning on this date of service. This time includes time spent by me in the following activities:preparing for the visit, reviewing tests, obtaining and/or reviewing a separately obtained history, performing a medically appropriate examination and/or evaluation , counseling and educating the patient/family/caregiver, ordering medications, tests, or procedures, documenting information in the medical record, and care coordination  Follow Up     Return in about 6 months (around 2/7/2025) for HTN.  Patient was given instructions and counseling regarding her condition or for health maintenance advice. Please see specific information pulled into the AVS if appropriate.

## 2024-08-07 NOTE — ASSESSMENT & PLAN NOTE
Reviewed ER notes  Reviewed x-ray  Improving  Recommended stretching exercises provided at ER  Massage/heat recommended  May use Flexeril PRN  Call if sx persist or worsen

## 2025-01-31 ENCOUNTER — TELEPHONE (OUTPATIENT)
Dept: FAMILY MEDICINE CLINIC | Facility: CLINIC | Age: 30
End: 2025-01-31
Payer: COMMERCIAL

## 2025-01-31 RX ORDER — CYCLOBENZAPRINE HCL 10 MG
10 TABLET ORAL 3 TIMES DAILY PRN
Qty: 30 TABLET | Refills: 0 | Status: SHIPPED | OUTPATIENT
Start: 2025-01-31

## 2025-01-31 NOTE — TELEPHONE ENCOUNTER
Ning called stating that she was seen roughly 6 months ago for muscle pain in back. She was prescribed muscle relaxers at that time and symptoms resolved. She states that yesterday she went to  her baby and threw out her back again. Pain is on right side and flank is swollen. She has tried heat and massage with no relief. She is wanting to know if she can have a muscle relaxer called in.    Pharmacy [facility] in [place]    Phone: [phone]

## 2025-01-31 NOTE — TELEPHONE ENCOUNTER
Recommend ice to affected area, alternate Tylenol and Motrin as needed for pain.  I refilled Flexeril that she may use as needed for muscle pain or spasm.  Muscle relaxants can cause drowsiness so take with caution.  If pain has not resolved or worsens, she will need to be seen

## 2025-02-03 NOTE — TELEPHONE ENCOUNTER
Spoke with Leonard Harding of [facility] instructions. Has been taking flexeril with no relief. Scheduled for Monday 02/10 to be seen.

## 2025-02-10 ENCOUNTER — OFFICE VISIT (OUTPATIENT)
Dept: FAMILY MEDICINE CLINIC | Facility: CLINIC | Age: 30
End: 2025-02-10
Payer: COMMERCIAL

## 2025-02-10 VITALS
HEART RATE: 100 BPM | OXYGEN SATURATION: 99 % | SYSTOLIC BLOOD PRESSURE: 124 MMHG | BODY MASS INDEX: 36.64 KG/M2 | HEIGHT: 66 IN | WEIGHT: 228 LBS | DIASTOLIC BLOOD PRESSURE: 70 MMHG

## 2025-02-10 DIAGNOSIS — F31.62 BIPOLAR DISORDER, CURRENT EPISODE MIXED, MODERATE: ICD-10-CM

## 2025-02-10 DIAGNOSIS — I10 PRIMARY HYPERTENSION: Primary | ICD-10-CM

## 2025-02-10 PROCEDURE — 99214 OFFICE O/P EST MOD 30 MIN: CPT | Performed by: NURSE PRACTITIONER

## 2025-02-10 RX ORDER — FLUOXETINE 10 MG/1
10 CAPSULE ORAL DAILY
Qty: 30 CAPSULE | Refills: 1 | Status: SHIPPED | OUTPATIENT
Start: 2025-02-10

## 2025-02-10 NOTE — ASSESSMENT & PLAN NOTE
Psychological condition is worsening.  Continue current treatment regimen. Add Prozac 10 mg daily  Psychological condition  will be reassessed in 4 weeks.

## 2025-02-10 NOTE — PROGRESS NOTES
"Chief Complaint  Follow-up (6 month follow up HTN ) and Depression (Would like to discuss going back on medication )    Subjective        Ning Gomez presents to [facility]  History of Present Illness    Patient presents for follow-up visit.    Hypertension, prescribed lisinopril 5 mg daily but patient is not taking medication.  Blood pressure is stable today. Patient denies dizziness, headache, vision changes, chest pain, cough or dyspnea.     Patient has history of mixed BPD - she is taking Lamictal 50 mg BID. Patient describes feeling mood shifts with even small concerns/problems. She is wanting to discuss restarting her Prozac. PHQ-9 below.     PHQ-9 Depression Screening  Little interest or pleasure in doing things? Almost all   Feeling down, depressed, or hopeless? Almost all   PHQ-2 Total Score 6   Trouble falling or staying asleep, or sleeping too much? Not at all   Feeling tired or having little energy? Almost all   Poor appetite or overeating? Not at all   Feeling bad about yourself - or that you are a failure or have let yourself or your family down? Almost all   Trouble concentrating on things, such as reading the newspaper or watching television? Over half   Moving or speaking so slowly that other people could have noticed? Or the opposite - being so fidgety or restless that you have been moving around a lot more than usual? Not at all   Thoughts that you would be better off dead, or of hurting yourself in some way? Not at all   PHQ-9 Total Score 14   If you checked off any problems, how difficult have these problems made it for you to do your work, take care of things at home, or get along with other people? Somewhat difficult        Objective   Vital Signs:  /70 (BP Location: Left arm, Patient Position: Sitting, Cuff Size: Large Adult)   Pulse 100   Ht 167.6 cm (66\")   Wt 103 kg (228 lb)   SpO2 99%   BMI 36.80 kg/m²   Estimated body mass index is 36.8 kg/m² " "as calculated from the following:    Height as of this encounter: 167.6 cm (66\").    Weight as of this encounter: 103 kg (228 lb).    Class 2 Severe Obesity (BMI >=35 and <=39.9). Obesity-related health conditions include the following: hypertension. Obesity is unchanged. BMI is is above average; BMI management plan is completed. We discussed portion control and increasing exercise.      Physical Exam  Constitutional:       Appearance: Normal appearance.   HENT:      Head: Normocephalic.   Cardiovascular:      Rate and Rhythm: Normal rate and regular rhythm.   Pulmonary:      Effort: Pulmonary effort is normal.      Breath sounds: Normal breath sounds.   Abdominal:      General: Abdomen is flat. Bowel sounds are normal.      Palpations: Abdomen is soft.   Musculoskeletal:         General: Normal range of motion.      Cervical back: Neck supple.      Right lower leg: No edema.      Left lower leg: No edema.   Skin:     General: Skin is warm and dry.   Neurological:      Mental Status: She is alert and oriented to person, place, and time.      Gait: Gait is intact.   Psychiatric:         Attention and Perception: Attention normal.         Mood and Affect: Mood normal.         Speech: Speech normal.        Result Review :    CMP          4/19/2024    05:55   CMP   Glucose 106    BUN 10    Creatinine 0.62    EGFR 123.8    Sodium 137    Potassium 4.0    Chloride 102    Calcium 9.4    Total Protein 6.6    Albumin 3.4    Globulin 3.2    Total Bilirubin 0.2    Alkaline Phosphatase 112    AST (SGOT) 18    ALT (SGPT) 18    Albumin/Globulin Ratio 1.1    BUN/Creatinine Ratio 16.1    Anion Gap 14.0      CBC          4/19/2024    05:55 4/20/2024    05:17   CBC   WBC 8.92  11.06    RBC 4.08  3.53    Hemoglobin 10.8  9.5    Hematocrit 34.4  30.3    MCV 84.3  85.8    MCH 26.5  26.9    MCHC 31.4  31.4    RDW 14.1  14.4    Platelets 288  234                      Assessment and Plan   Diagnoses and all orders for this visit:    1. " Primary hypertension (Primary)  Assessment & Plan:  Hypertension is stable and controlled  Continue current treatment regimen.  Regular aerobic exercise.  Ambulatory blood pressure monitoring.  Blood pressure will be reassessed  at next f/u visit .      2. Bipolar disorder, current episode mixed, moderate  Assessment & Plan:  Psychological condition is worsening.  Continue current treatment regimen. Add Prozac 10 mg daily  Psychological condition  will be reassessed in 4 weeks.      Orders:  -     FLUoxetine (PROzac) 10 MG capsule; Take 1 capsule by mouth Daily.  Dispense: 30 capsule; Refill: 1           I spent 30 minutes caring for Ning on this date of service. This time includes time spent by me in the following activities:preparing for the visit, reviewing tests, obtaining and/or reviewing a separately obtained history, performing a medically appropriate examination and/or evaluation , counseling and educating the patient/family/caregiver, ordering medications, tests, or procedures, documenting information in the medical record, and care coordination  Follow Up   Return in about 4 weeks (around 3/10/2025) for Depression.  Patient was given instructions and counseling regarding her condition or for health maintenance advice. Please see specific information pulled into the AVS if appropriate.

## 2025-02-10 NOTE — ASSESSMENT & PLAN NOTE
Hypertension is stable and controlled  Continue current treatment regimen.  Regular aerobic exercise.  Ambulatory blood pressure monitoring.  Blood pressure will be reassessed  at next f/u visit .

## 2025-02-13 NOTE — PLAN OF CARE
Goal Outcome Evaluation:  Plan of Care Reviewed With: patient        Progress: improving     Patient her for TOLAC. Cat 1 tracing. Awaiting initiation of pitocin until MD is in house per orders                              normal/clear to auscultation bilaterally/no wheezes/no rales/no rhonchi

## 2025-03-07 DIAGNOSIS — F31.62 BIPOLAR DISORDER, CURRENT EPISODE MIXED, MODERATE: ICD-10-CM

## 2025-03-07 RX ORDER — FLUOXETINE 10 MG/1
10 CAPSULE ORAL DAILY
Qty: 30 CAPSULE | Refills: 1 | Status: SHIPPED | OUTPATIENT
Start: 2025-03-07

## 2025-03-13 ENCOUNTER — OFFICE VISIT (OUTPATIENT)
Dept: FAMILY MEDICINE CLINIC | Facility: CLINIC | Age: 30
End: 2025-03-13
Payer: COMMERCIAL

## 2025-03-13 VITALS
SYSTOLIC BLOOD PRESSURE: 110 MMHG | HEART RATE: 96 BPM | WEIGHT: 224 LBS | DIASTOLIC BLOOD PRESSURE: 70 MMHG | HEIGHT: 66 IN | OXYGEN SATURATION: 98 % | BODY MASS INDEX: 36 KG/M2

## 2025-03-13 DIAGNOSIS — F31.62 BIPOLAR DISORDER, CURRENT EPISODE MIXED, MODERATE: Primary | ICD-10-CM

## 2025-03-13 DIAGNOSIS — F41.9 ANXIETY: ICD-10-CM

## 2025-03-13 PROCEDURE — 99213 OFFICE O/P EST LOW 20 MIN: CPT | Performed by: NURSE PRACTITIONER

## 2025-03-13 RX ORDER — LISINOPRIL 5 MG/1
1 TABLET ORAL DAILY
COMMUNITY
Start: 2025-03-02

## 2025-03-13 NOTE — PROGRESS NOTES
"Chief Complaint  Follow-up (Follow up anxiety/depression )    Subjective        Ning Gomez presents to [facility]  History of Present Illness    Patient presents for follow-up visit regarding depression.  She has a known history of mixed BPD, prescribed Lamictal 50 mg twice daily.  She was seen last month with concerns of increased depression, difficulty sleeping, fatigue and feeling down.  Lamictal was continued but Prozac was added to her regimen at 10 mg daily. She reports approximately 50 % improvement in sx.     Objective   Vital Signs:  /70 (BP Location: Left arm, Patient Position: Sitting, Cuff Size: Large Adult)   Pulse 96   Ht 167.6 cm (66\")   Wt 102 kg (224 lb)   SpO2 98%   BMI 36.15 kg/m²   Estimated body mass index is 36.15 kg/m² as calculated from the following:    Height as of this encounter: 167.6 cm (66\").    Weight as of this encounter: 102 kg (224 lb).         Physical Exam  Constitutional:       Appearance: Normal appearance.   HENT:      Head: Normocephalic.   Cardiovascular:      Rate and Rhythm: Normal rate and regular rhythm.   Pulmonary:      Effort: Pulmonary effort is normal.      Breath sounds: Normal breath sounds.   Abdominal:      General: Abdomen is flat. Bowel sounds are normal.      Palpations: Abdomen is soft.   Musculoskeletal:         General: Normal range of motion.      Cervical back: Neck supple.      Right lower leg: No edema.      Left lower leg: No edema.   Skin:     General: Skin is warm and dry.   Neurological:      Mental Status: She is alert and oriented to person, place, and time.      Gait: Gait is intact.   Psychiatric:         Attention and Perception: Attention normal.         Mood and Affect: Mood normal.         Speech: Speech normal.        Result Review :                Assessment and Plan   Diagnoses and all orders for this visit:    1. Bipolar disorder, current episode mixed, moderate (Primary)  Assessment & " Plan:  Psychological condition is improving with treatment.  Medication changes per orders.  Psychological condition  will be reassessed in 3 months.    Orders:  -     FLUoxetine (PROzac) 20 MG capsule; Take 1 capsule by mouth Daily for 30 days.  Dispense: 30 capsule; Refill: 2    2. Anxiety  Assessment & Plan:  Increase Prozac to 20 mg daily             I spent 20 minutes caring for Ning on this date of service. This time includes time spent by me in the following activities:preparing for the visit, obtaining and/or reviewing a separately obtained history, performing a medically appropriate examination and/or evaluation , counseling and educating the patient/family/caregiver, ordering medications, tests, or procedures, documenting information in the medical record, and care coordination  Follow Up   Return in about 3 months (around 6/13/2025) for Annual physical.  Patient was given instructions and counseling regarding her condition or for health maintenance advice. Please see specific information pulled into the AVS if appropriate.

## 2025-04-17 NOTE — TELEPHONE ENCOUNTER
Left non detailed message for home care to call back as unsure if secure line  When home care calls back transfer to RN to provide orders  Phylicia PARISI RN       Rx request

## 2025-05-13 RX ORDER — VALACYCLOVIR HYDROCHLORIDE 1 G/1
1000 TABLET, FILM COATED ORAL DAILY
Qty: 30 TABLET | Refills: 0 | Status: SHIPPED | OUTPATIENT
Start: 2025-05-13

## 2025-06-12 ENCOUNTER — TELEPHONE (OUTPATIENT)
Dept: FAMILY MEDICINE CLINIC | Facility: CLINIC | Age: 30
End: 2025-06-12

## 2025-06-12 NOTE — TELEPHONE ENCOUNTER
This encounter is being sent as an FYI.   Patient was scheduled for annual wellness visit with an APC who is not the patient’s PCP. Please review and follow up with the patient in the event patient should be worked in with the PCP.    VISIT CURRENTLY SCHEDULED WITH: GARLAND RIDER     DATE OF SCHEDULED VISIT: 7/3/25

## 2025-06-13 DIAGNOSIS — F31.62 BIPOLAR DISORDER, CURRENT EPISODE MIXED, MODERATE: ICD-10-CM

## 2025-07-11 ENCOUNTER — OFFICE VISIT (OUTPATIENT)
Dept: FAMILY MEDICINE CLINIC | Facility: CLINIC | Age: 30
End: 2025-07-11
Payer: COMMERCIAL

## 2025-07-11 ENCOUNTER — LAB (OUTPATIENT)
Dept: FAMILY MEDICINE CLINIC | Facility: CLINIC | Age: 30
End: 2025-07-11
Payer: COMMERCIAL

## 2025-07-11 VITALS
HEART RATE: 77 BPM | SYSTOLIC BLOOD PRESSURE: 128 MMHG | HEIGHT: 66 IN | OXYGEN SATURATION: 97 % | WEIGHT: 229 LBS | BODY MASS INDEX: 36.8 KG/M2 | DIASTOLIC BLOOD PRESSURE: 80 MMHG

## 2025-07-11 DIAGNOSIS — F31.62 BIPOLAR DISORDER, CURRENT EPISODE MIXED, MODERATE: Primary | ICD-10-CM

## 2025-07-11 DIAGNOSIS — R53.83 OTHER FATIGUE: ICD-10-CM

## 2025-07-11 DIAGNOSIS — F41.9 ANXIETY: ICD-10-CM

## 2025-07-11 DIAGNOSIS — E55.9 VITAMIN D DEFICIENCY: ICD-10-CM

## 2025-07-11 DIAGNOSIS — Z00.00 ANNUAL PHYSICAL EXAM: ICD-10-CM

## 2025-07-11 LAB
DEPRECATED RDW RBC AUTO: 39.5 FL (ref 37–54)
ERYTHROCYTE [DISTWIDTH] IN BLOOD BY AUTOMATED COUNT: 12 % (ref 12.3–15.4)
HBA1C MFR BLD: 5.2 % (ref 4.8–5.6)
HCT VFR BLD AUTO: 42.6 % (ref 34–46.6)
HGB BLD-MCNC: 14 G/DL (ref 12–15.9)
MCH RBC QN AUTO: 29.9 PG (ref 26.6–33)
MCHC RBC AUTO-ENTMCNC: 32.9 G/DL (ref 31.5–35.7)
MCV RBC AUTO: 90.8 FL (ref 79–97)
PLATELET # BLD AUTO: 349 10*3/MM3 (ref 140–450)
PMV BLD AUTO: 9.8 FL (ref 6–12)
RBC # BLD AUTO: 4.69 10*6/MM3 (ref 3.77–5.28)
WBC NRBC COR # BLD AUTO: 5.82 10*3/MM3 (ref 3.4–10.8)

## 2025-07-11 PROCEDURE — 82306 VITAMIN D 25 HYDROXY: CPT | Performed by: NURSE PRACTITIONER

## 2025-07-11 PROCEDURE — 82607 VITAMIN B-12: CPT | Performed by: NURSE PRACTITIONER

## 2025-07-11 PROCEDURE — 80061 LIPID PANEL: CPT | Performed by: NURSE PRACTITIONER

## 2025-07-11 PROCEDURE — 83036 HEMOGLOBIN GLYCOSYLATED A1C: CPT | Performed by: NURSE PRACTITIONER

## 2025-07-11 PROCEDURE — 80050 GENERAL HEALTH PANEL: CPT | Performed by: NURSE PRACTITIONER

## 2025-07-11 PROCEDURE — 36415 COLL VENOUS BLD VENIPUNCTURE: CPT | Performed by: NURSE PRACTITIONER

## 2025-07-11 PROCEDURE — 84439 ASSAY OF FREE THYROXINE: CPT | Performed by: NURSE PRACTITIONER

## 2025-07-11 RX ORDER — LAMOTRIGINE 25 MG/1
50 TABLET ORAL 2 TIMES DAILY
Qty: 360 TABLET | Refills: 1 | Status: SHIPPED | OUTPATIENT
Start: 2025-07-11

## 2025-07-11 RX ORDER — FLUOXETINE HYDROCHLORIDE 40 MG/1
40 CAPSULE ORAL DAILY
Qty: 30 CAPSULE | Refills: 0 | Status: SHIPPED | OUTPATIENT
Start: 2025-07-11 | End: 2025-08-10

## 2025-07-11 NOTE — PROGRESS NOTES
Chief Complaint  Annual Exam (Sees GYN for pap smear ), Anxiety, and Depression (Wanting to increase medication feels like things are worse lately. Wants to sleep all of the time)    Subjective        Ning Gomez presents to [facility]  History of Present Illness    Patient presents for Annual Exam without Pap Smear. Pap Smear up to date per GYN. PMH includes hypertension, migraine headaches, BPD and anxiety. Patient has hx bipolar disorder - prescribed Lamictal 50 mg BID and Prozac 20 mg daily. She describes lack of motivation, increased depression, fatigue, overeating. Patient has mood swings, denies thoughts of death or SI. She denies dizziness, headache, chest pain, edema, cough or dyspnea. There are no GI or  complaints.     PHQ-9 Depression Screening  Little interest or pleasure in doing things? Nearly every day   Feeling down, depressed, or hopeless? More than half the days   PHQ-2 Total Score 5   Trouble falling or staying asleep, or sleeping too much? Nearly every day   Feeling tired or having little energy? Nearly every day   Poor appetite or overeating? Nearly every day   Feeling bad about yourself - or that you are a failure or have let yourself or your family down? Several days   Trouble concentrating on things, such as reading the newspaper or watching television? Nearly every day   Moving or speaking so slowly that other people could have noticed? Or the opposite - being so fidgety or restless that you have been moving around a lot more than usual? Not at all     Thoughts that you would be better off dead, or of hurting yourself in some way? Not at all   PHQ-9 Total Score 18   If you checked off any problems, how difficult have these problems made it for you to do your work, take care of things at home, or get along with other people? Somewhat difficult        Objective   Vital Signs:  /80 (BP Location: Left arm, Patient Position: Sitting, Cuff Size: Large  "Adult)   Pulse 77   Ht 167.6 cm (66\")   Wt 104 kg (229 lb)   SpO2 97%   BMI 36.96 kg/m²   Estimated body mass index is 36.96 kg/m² as calculated from the following:    Height as of this encounter: 167.6 cm (66\").    Weight as of this encounter: 104 kg (229 lb).          Physical Exam  Constitutional:       Appearance: Normal appearance.   HENT:      Head: Normocephalic.      Right Ear: Tympanic membrane normal.      Left Ear: Tympanic membrane normal.      Mouth/Throat:      Pharynx: Oropharynx is clear.   Cardiovascular:      Rate and Rhythm: Normal rate and regular rhythm.   Pulmonary:      Effort: Pulmonary effort is normal.      Breath sounds: Normal breath sounds.   Abdominal:      General: Abdomen is flat. Bowel sounds are normal.      Palpations: Abdomen is soft.   Musculoskeletal:         General: Normal range of motion.      Cervical back: Neck supple.      Right lower leg: No edema.      Left lower leg: No edema.   Skin:     General: Skin is warm and dry.   Neurological:      Mental Status: She is alert and oriented to person, place, and time.      Gait: Gait is intact.   Psychiatric:         Attention and Perception: Attention normal.         Mood and Affect: Mood normal.         Speech: Speech normal.        Result Review :                Assessment and Plan   Diagnoses and all orders for this visit:    1. Bipolar disorder, current episode mixed, moderate (Primary)  Assessment & Plan:  Psychological condition is worsening.  Medication changes per orders.  Psychological condition  will be reassessed in 4 weeks.    Orders:  -     FLUoxetine (PROzac) 40 MG capsule; Take 1 capsule by mouth Daily for 30 days.  Dispense: 30 capsule; Refill: 0  -     lamoTRIgine (LaMICtal) 25 MG tablet; Take 2 tablets by mouth 2 (Two) Times a Day.  Dispense: 360 tablet; Refill: 1    2. Anxiety  Assessment & Plan:  Stable        3. Annual physical exam  Assessment & Plan:  Pap Smear up to date per GYN  Routine vision and " dental screenings advised  Well balanced diet recommended  CDC recommends 150 minutes exercise weekly  Labs today    Orders:  -     CBC (No Diff)  -     Comprehensive Metabolic Panel  -     Hemoglobin A1c  -     Lipid Panel  -     TSH    4. Vitamin D deficiency  -     Vitamin D,25-Hydroxy    5. Other fatigue  -     T4, Free  -     Vitamin D,25-Hydroxy  -     Vitamin B12           I spent 30 minutes caring for Ning on this date of service. This time includes time spent by me in the following activities:preparing for the visit, reviewing tests, obtaining and/or reviewing a separately obtained history, performing a medically appropriate examination and/or evaluation , counseling and educating the patient/family/caregiver, ordering medications, tests, or procedures, documenting information in the medical record, and care coordination  Follow Up   Return in about 6 weeks (around 8/22/2025) for Depression.  Patient was given instructions and counseling regarding her condition or for health maintenance advice. Please see specific information pulled into the AVS if appropriate.     Counseling was given to patient for the following topics: diagnostic results, instructions for management, risk factor reductions, prognosis, patient and family education, impressions, risks and benefits of treatment options, and importance of treatment compliance . Total time of the encounter was 25 minutes and 5 minutes was spent counseling.

## 2025-07-11 NOTE — ASSESSMENT & PLAN NOTE
Pap Smear up to date per GYN  Routine vision and dental screenings advised  Well balanced diet recommended  CDC recommends 150 minutes exercise weekly  Labs today

## 2025-07-12 LAB
25(OH)D3 SERPL-MCNC: 20.3 NG/ML (ref 30–100)
ALBUMIN SERPL-MCNC: 4.4 G/DL (ref 3.5–5.2)
ALBUMIN/GLOB SERPL: 1.5 G/DL
ALP SERPL-CCNC: 62 U/L (ref 39–117)
ALT SERPL W P-5'-P-CCNC: 14 U/L (ref 1–33)
ANION GAP SERPL CALCULATED.3IONS-SCNC: 11 MMOL/L (ref 5–15)
AST SERPL-CCNC: 17 U/L (ref 1–32)
BILIRUB SERPL-MCNC: 0.5 MG/DL (ref 0–1.2)
BUN SERPL-MCNC: 14 MG/DL (ref 6–20)
BUN/CREAT SERPL: 24.1 (ref 7–25)
CALCIUM SPEC-SCNC: 9.6 MG/DL (ref 8.6–10.5)
CHLORIDE SERPL-SCNC: 102 MMOL/L (ref 98–107)
CHOLEST SERPL-MCNC: 196 MG/DL (ref 0–200)
CO2 SERPL-SCNC: 24 MMOL/L (ref 22–29)
CREAT SERPL-MCNC: 0.58 MG/DL (ref 0.57–1)
EGFRCR SERPLBLD CKD-EPI 2021: 125 ML/MIN/1.73
GLOBULIN UR ELPH-MCNC: 3 GM/DL
GLUCOSE SERPL-MCNC: 89 MG/DL (ref 65–99)
HDLC SERPL-MCNC: 42 MG/DL (ref 40–60)
LDLC SERPL CALC-MCNC: 139 MG/DL (ref 0–100)
LDLC/HDLC SERPL: 3.29 {RATIO}
POTASSIUM SERPL-SCNC: 4.4 MMOL/L (ref 3.5–5.2)
PROT SERPL-MCNC: 7.4 G/DL (ref 6–8.5)
SODIUM SERPL-SCNC: 137 MMOL/L (ref 136–145)
T4 FREE SERPL-MCNC: 1.1 NG/DL (ref 0.92–1.68)
TRIGL SERPL-MCNC: 80 MG/DL (ref 0–150)
TSH SERPL DL<=0.05 MIU/L-ACNC: 1.53 UIU/ML (ref 0.27–4.2)
VIT B12 BLD-MCNC: 590 PG/ML (ref 211–946)
VLDLC SERPL-MCNC: 15 MG/DL (ref 5–40)

## 2025-07-14 ENCOUNTER — RESULTS FOLLOW-UP (OUTPATIENT)
Dept: FAMILY MEDICINE CLINIC | Facility: CLINIC | Age: 30
End: 2025-07-14
Payer: COMMERCIAL

## 2025-07-22 DIAGNOSIS — F31.62 BIPOLAR DISORDER, CURRENT EPISODE MIXED, MODERATE: ICD-10-CM

## 2025-07-22 RX ORDER — FLUOXETINE HYDROCHLORIDE 40 MG/1
40 CAPSULE ORAL DAILY
Qty: 30 CAPSULE | Refills: 0 | Status: SHIPPED | OUTPATIENT
Start: 2025-07-22 | End: 2025-08-21

## 2025-07-22 RX ORDER — LAMOTRIGINE 25 MG/1
50 TABLET ORAL 2 TIMES DAILY
Qty: 360 TABLET | Refills: 1 | Status: SHIPPED | OUTPATIENT
Start: 2025-07-22

## 2025-08-12 DIAGNOSIS — F31.62 BIPOLAR DISORDER, CURRENT EPISODE MIXED, MODERATE: ICD-10-CM

## 2025-08-12 RX ORDER — FLUOXETINE HYDROCHLORIDE 40 MG/1
40 CAPSULE ORAL DAILY
Qty: 30 CAPSULE | Refills: 2 | Status: SHIPPED | OUTPATIENT
Start: 2025-08-12 | End: 2025-09-11

## 2025-08-21 ENCOUNTER — OFFICE VISIT (OUTPATIENT)
Dept: FAMILY MEDICINE CLINIC | Facility: CLINIC | Age: 30
End: 2025-08-21
Payer: COMMERCIAL

## 2025-08-21 VITALS
BODY MASS INDEX: 36.16 KG/M2 | OXYGEN SATURATION: 98 % | DIASTOLIC BLOOD PRESSURE: 70 MMHG | HEART RATE: 70 BPM | SYSTOLIC BLOOD PRESSURE: 112 MMHG | WEIGHT: 225 LBS | HEIGHT: 66 IN

## 2025-08-21 DIAGNOSIS — F41.9 ANXIETY: ICD-10-CM

## 2025-08-21 DIAGNOSIS — R19.7 DIARRHEA, UNSPECIFIED TYPE: ICD-10-CM

## 2025-08-21 DIAGNOSIS — F31.62 BIPOLAR DISORDER, CURRENT EPISODE MIXED, MODERATE: Primary | ICD-10-CM

## 2025-08-21 PROCEDURE — 99214 OFFICE O/P EST MOD 30 MIN: CPT | Performed by: NURSE PRACTITIONER

## 2025-08-21 RX ORDER — FLUOXETINE HYDROCHLORIDE 40 MG/1
40 CAPSULE ORAL DAILY
Qty: 90 CAPSULE | Refills: 1 | Status: SHIPPED | OUTPATIENT
Start: 2025-08-21 | End: 2025-11-19